# Patient Record
Sex: FEMALE | Race: WHITE | NOT HISPANIC OR LATINO | Employment: FULL TIME | URBAN - METROPOLITAN AREA
[De-identification: names, ages, dates, MRNs, and addresses within clinical notes are randomized per-mention and may not be internally consistent; named-entity substitution may affect disease eponyms.]

---

## 2017-03-22 ENCOUNTER — OFFICE VISIT (OUTPATIENT)
Dept: PSYCHIATRY | Facility: HOSPITAL | Age: 57
End: 2017-03-22

## 2017-03-22 DIAGNOSIS — F43.23 ADJUSTMENT DISORDER WITH MIXED ANXIETY AND DEPRESSED MOOD: Primary | ICD-10-CM

## 2017-03-22 PROCEDURE — 90791 PSYCH DIAGNOSTIC EVALUATION: CPT | Performed by: SOCIAL WORKER

## 2017-03-22 NOTE — PROGRESS NOTES
[id number]. Pt in for initial evaluation. She presents with increase in anger and irritability. She describes a lot of loss in her life and feels it may be impacting her current emotional difficulties. She lost her mother at age 14 and she had a sister die six months after having a baby from a brain tumor. Pt also states she was unable to have children due to severe Endometriosis. She states she is upset with  and his family because he is spending several days and nights a week with his 92 yr old mother who is in poor health. Talked about reasons she is angry and how it affects her in negative ways. She agreed to be more supportive of  and to work on improving her self care. No SI/HI present.

## 2017-04-07 DIAGNOSIS — E78.5 HYPERLIPIDEMIA, UNSPECIFIED HYPERLIPIDEMIA TYPE: Primary | ICD-10-CM

## 2017-04-07 DIAGNOSIS — I10 ESSENTIAL HYPERTENSION: ICD-10-CM

## 2017-04-10 ENCOUNTER — TELEPHONE (OUTPATIENT)
Dept: FAMILY MEDICINE CLINIC | Facility: CLINIC | Age: 57
End: 2017-04-10

## 2017-04-10 NOTE — TELEPHONE ENCOUNTER
Will need an appt to discuss. In my last note I recommended that she see a gastro if her constipation persisted     ---- Message from Nani Moore MA sent at 4/10/2017  9:06 AM EDT -----  Contact: PT BLAKE #[phone]  Please advise las seen on 10/16 and correct appt day is 04/28.   ----- Message -----     From: Viki Barajas     Sent: 4/10/2017   9:00 AM       To: Nani Moore MA    SAYS YOU TALKED WITH HER PREVIOUSLY ABOUT GETTING AN RX FOR LANEZE FOR CONSTIPATION AND SHE WOULD NOW LIKE TO HAVE THAT. SHE HAS AN APT WITH [facility] 5/28 BUT DOES NOT THINK SHE CAN WAIT THAT LONG BEFORE GETTING THE MEDICATION.    ALEXANDREA GERARDO LN   30 DAY    PLEASE LET HER KNOW IF THIS CAN BE DONE

## 2017-04-12 ENCOUNTER — OFFICE VISIT (OUTPATIENT)
Dept: PSYCHIATRY | Facility: HOSPITAL | Age: 57
End: 2017-04-12

## 2017-04-12 DIAGNOSIS — F43.23 ADJUSTMENT DISORDER WITH MIXED ANXIETY AND DEPRESSED MOOD: Primary | ICD-10-CM

## 2017-04-12 PROCEDURE — 90834 PSYTX W PT 45 MINUTES: CPT | Performed by: SOCIAL WORKER

## 2017-04-12 NOTE — PROGRESS NOTES
"[id number]. Pt in for session. She states things feel improved as she was able to talk about her feelings with her  without the \"edge\" of anger she had before. She is gaining more insight and setting healthy boundaries. She will continue to focus on healthy self care.  "

## 2017-04-13 NOTE — TELEPHONE ENCOUNTER
NEVER GOT IN TOUCH WITH PT DESPITE TRYING TO REACH SEVERAL TIMES, PT HAS AN UP COMING APPT, THAT THE ISSUE WILL BE DISCUSSED IN.

## 2017-04-14 DIAGNOSIS — K59.09 CHRONIC CONSTIPATION: Primary | ICD-10-CM

## 2017-04-19 LAB
ALBUMIN SERPL-MCNC: 4.5 G/DL (ref 3.5–5.2)
ALBUMIN/GLOB SERPL: 1.9 G/DL
ALP SERPL-CCNC: 107 U/L (ref 39–117)
ALT SERPL-CCNC: 18 U/L (ref 1–33)
AST SERPL-CCNC: 22 U/L (ref 1–32)
BASOPHILS # BLD AUTO: 0.02 10*3/MM3 (ref 0–0.2)
BASOPHILS NFR BLD AUTO: 0.4 % (ref 0–1.5)
BILIRUB SERPL-MCNC: 0.7 MG/DL (ref 0.1–1.2)
BUN SERPL-MCNC: 15 MG/DL (ref 6–20)
BUN/CREAT SERPL: 18.3 (ref 7–25)
CALCIUM SERPL-MCNC: 9.6 MG/DL (ref 8.6–10.5)
CHLORIDE SERPL-SCNC: 102 MMOL/L (ref 98–107)
CHOLEST SERPL-MCNC: 196 MG/DL (ref 0–200)
CO2 SERPL-SCNC: 27.1 MMOL/L (ref 22–29)
CREAT SERPL-MCNC: 0.82 MG/DL (ref 0.57–1)
EOSINOPHIL # BLD AUTO: 0.12 10*3/MM3 (ref 0–0.7)
EOSINOPHIL NFR BLD AUTO: 2.5 % (ref 0.3–6.2)
ERYTHROCYTE [DISTWIDTH] IN BLOOD BY AUTOMATED COUNT: 13.7 % (ref 11.7–13)
GLOBULIN SER CALC-MCNC: 2.4 GM/DL
GLUCOSE SERPL-MCNC: 91 MG/DL (ref 65–99)
HCT VFR BLD AUTO: 44 % (ref 35.6–45.5)
HDLC SERPL-MCNC: 81 MG/DL (ref 40–60)
HGB BLD-MCNC: 14.6 G/DL (ref 11.9–15.5)
IMM GRANULOCYTES # BLD: 0 10*3/MM3 (ref 0–0.03)
IMM GRANULOCYTES NFR BLD: 0 % (ref 0–0.5)
LDLC SERPL CALC-MCNC: 99 MG/DL (ref 0–100)
LDLC/HDLC SERPL: 1.22 {RATIO}
LYMPHOCYTES # BLD AUTO: 1.35 10*3/MM3 (ref 0.9–4.8)
LYMPHOCYTES NFR BLD AUTO: 27.6 % (ref 19.6–45.3)
MCH RBC QN AUTO: 33 PG (ref 26.9–32)
MCHC RBC AUTO-ENTMCNC: 33.2 G/DL (ref 32.4–36.3)
MCV RBC AUTO: 99.5 FL (ref 80.5–98.2)
MONOCYTES # BLD AUTO: 0.3 10*3/MM3 (ref 0.2–1.2)
MONOCYTES NFR BLD AUTO: 6.1 % (ref 5–12)
NEUTROPHILS # BLD AUTO: 3.1 10*3/MM3 (ref 1.9–8.1)
NEUTROPHILS NFR BLD AUTO: 63.4 % (ref 42.7–76)
PLATELET # BLD AUTO: 138 10*3/MM3 (ref 140–500)
POTASSIUM SERPL-SCNC: 4.3 MMOL/L (ref 3.5–5.2)
PROT SERPL-MCNC: 6.9 G/DL (ref 6–8.5)
RBC # BLD AUTO: 4.42 10*6/MM3 (ref 3.9–5.2)
SODIUM SERPL-SCNC: 141 MMOL/L (ref 136–145)
TRIGL SERPL-MCNC: 82 MG/DL (ref 0–150)
TSH SERPL DL<=0.005 MIU/L-ACNC: 2.26 MIU/ML (ref 0.27–4.2)
VLDLC SERPL CALC-MCNC: 16.4 MG/DL (ref 5–40)
WBC # BLD AUTO: 4.89 10*3/MM3 (ref 4.5–10.7)

## 2017-04-27 RX ORDER — OMEPRAZOLE 20 MG/1
CAPSULE, DELAYED RELEASE ORAL
COMMUNITY
Start: 2017-03-09 | End: 2019-01-15

## 2017-04-27 RX ORDER — ESTRADIOL 0.07 MG/D
FILM, EXTENDED RELEASE TRANSDERMAL
COMMUNITY
Start: 2017-03-21 | End: 2019-01-15

## 2017-04-28 ENCOUNTER — OFFICE VISIT (OUTPATIENT)
Dept: FAMILY MEDICINE CLINIC | Facility: CLINIC | Age: 57
End: 2017-04-28

## 2017-04-28 VITALS
SYSTOLIC BLOOD PRESSURE: 120 MMHG | DIASTOLIC BLOOD PRESSURE: 70 MMHG | HEART RATE: 63 BPM | TEMPERATURE: 98.3 F | RESPIRATION RATE: 16 BRPM | OXYGEN SATURATION: 98 % | WEIGHT: 177.6 LBS | HEIGHT: 67 IN | BODY MASS INDEX: 27.88 KG/M2

## 2017-04-28 DIAGNOSIS — E78.5 HYPERLIPIDEMIA, UNSPECIFIED HYPERLIPIDEMIA TYPE: ICD-10-CM

## 2017-04-28 DIAGNOSIS — K59.00 CONSTIPATION, UNSPECIFIED CONSTIPATION TYPE: ICD-10-CM

## 2017-04-28 DIAGNOSIS — M54.50 CHRONIC BILATERAL LOW BACK PAIN WITHOUT SCIATICA: Primary | ICD-10-CM

## 2017-04-28 DIAGNOSIS — G89.29 CHRONIC BILATERAL LOW BACK PAIN WITHOUT SCIATICA: Primary | ICD-10-CM

## 2017-04-28 PROCEDURE — 99214 OFFICE O/P EST MOD 30 MIN: CPT | Performed by: NURSE PRACTITIONER

## 2017-04-28 RX ORDER — ATORVASTATIN CALCIUM 20 MG/1
TABLET, FILM COATED ORAL
COMMUNITY
Start: 2017-04-14 | End: 2021-12-03

## 2017-04-28 NOTE — PROGRESS NOTES
Subjective   Cristina Larose is a 56 y.o. female. Patient is here today for   Chief Complaint   Patient presents with   • Follow-up     LABS    • Hyperlipidemia          Vitals:    04/28/17 0800   BP: 120/70   Pulse: 63   Resp: 16   Temp: 98.3 °F (36.8 °C)   SpO2: 98%   No LMP recorded. Patient has had a hysterectomy.          Past Medical History:   Diagnosis Date   • Gallstone    • GERD (gastroesophageal reflux disease)    • Hearing loss in right ear    • History of kidney stones    • Insomnia    • Low back pain    • PONV (postoperative nausea and vomiting)    • Ringing of ears       Allergies   Allergen Reactions   • Roxicet [Oxycodone-Acetaminophen] Nausea Only and Other (See Comments)     Headache       Social History     Social History   • Marital status:      Spouse name: N/A   • Number of children: N/A   • Years of education: N/A     Occupational History   • Not on file.     Social History Main Topics   • Smoking status: Never Smoker   • Smokeless tobacco: Never Used   • Alcohol use Yes      Comment: VERY RARE   • Drug use: No   • Sexual activity: Not on file     Other Topics Concern   • Not on file     Social History Narrative   • No narrative on file        Current Outpatient Prescriptions:   •  atorvastatin (LIPITOR) 20 MG tablet, , Disp: , Rfl:   •  baclofen (LIORESAL) 10 MG tablet, Take 1 tablet by mouth 3 (Three) Times a Day., Disp: 90 tablet, Rfl: 3  •  Cholecalciferol (VITAMIN D PO), Take 3 tablets by mouth every night., Disp: , Rfl:   •  Cyanocobalamin (B-12 PO), Take 1 tablet by mouth 3 (three) times a week., Disp: , Rfl:   •  estradiol (MINIVELLE, VIVELLE-DOT) 0.075 MG/24HR patch, , Disp: , Rfl:   •  Iron tablet, Take 3 tablets by mouth 1 (one) time per week., Disp: , Rfl:   •  Linaclotide 145 MCG capsule, Take 145 mcg by mouth Daily., Disp: 30 capsule, Rfl: 0  •  Magnesium 250 MG tablet, Take 200 mg by mouth daily., Disp: , Rfl:   •  Melatonin 10 MG capsule, Take 10 mg by mouth as needed.,  Disp: , Rfl:   •  Omega-3 Fatty Acids (FISH OIL) 600 MG capsule, Take 600 mg by mouth Daily. HELD FOR SURGERY, Disp: 30 capsule, Rfl:   •  omeprazole (priLOSEC) 20 MG capsule, , Disp: , Rfl:      Objective   History of Present Illness  Cristina is here for a follow up for HLD. She exercises a lot and has been eating well. She is compliant with her medication and is not experiencing any side effects. I discussed her labs in detail   She has had difficulty with chronic constipation, which has persistent even since her gallbladder was removed 6 months ago. She was called in linzess and that is working well for her. She has not had any lower abdominal cramping or diarrhea. She has had normal BMs since taking it.   She is concerned that she has had low back pain for 3 months. She thought it was due to kidney stones and saw her urologist but there were no stones. She feels like there is something in the right side of her back. She has been to the chiropractor with little relief of symptoms. The pain is moderate, non radiating, and she denies any radicular symptoms. She denies any known injury. She would like to set up PT and possibly see a specialist.     Review of Systems   Constitutional: Negative for chills, fatigue and fever.   HENT: Negative.    Respiratory: Negative for cough, shortness of breath and wheezing.    Cardiovascular: Positive for leg swelling. Negative for chest pain and palpitations.   Gastrointestinal: Positive for constipation (CHRONIC ).        BLOATING   Musculoskeletal: Positive for back pain. Negative for gait problem.        BILATERAL FOOT  PAIN AFTER WALKING FOR 2-3 HOURS, LOCATED IN THE HEEL,    Neurological: Negative for dizziness, weakness, light-headedness, numbness and headaches.   Psychiatric/Behavioral: Negative.      PHQ-9 Depression Screening 4/28/2017   Little interest or pleasure in doing things 0   Feeling down, depressed, or hopeless 0   Trouble falling or staying asleep, or sleeping  too much 1   Feeling tired or having little energy 1   Poor appetite or overeating 0   Feeling bad about yourself - or that you are a failure or have let yourself or your family down 0   Trouble concentrating on things, such as reading the newspaper or watching television 0   Moving or speaking so slowly that other people could have noticed. Or the opposite - being so fidgety or restless that you have been moving around a lot more than usual 0   Thoughts that you would be better off dead, or of hurting yourself in some way 0   PHQ-9 Total Score 2   If you checked off any problems, how difficult have these problems made it for you to do your work, take care of things at home, or get along with other people? Not difficult at all       Physical Exam   Constitutional: Vital signs are normal. She appears well-developed and well-nourished. No distress.   HENT:   Mouth/Throat: Mucous membranes are normal.   Cardiovascular: Normal rate, regular rhythm and normal heart sounds.    Pulmonary/Chest: Effort normal and breath sounds normal.   Abdominal: Soft. Normal appearance and bowel sounds are normal. There is no tenderness.   Musculoskeletal:        Lumbar back: She exhibits normal range of motion, no tenderness, no swelling and no deformity.   Neurological: She is alert. Gait normal.   Skin: Skin is warm and dry.   Psychiatric: She has a normal mood and affect. Her speech is normal.     Below is the patient's most recent value for Albumin, ALT, AST, BUN, Calcium, Chloride, Cholesterol, CO2, Creatinine, GFR, Glucose, HDL, Hematocrit, Hemoglobin, Hemoglobin A1C, LDL, Magnesium, Phosphorus, Platelets, Potassium, PSA, Sodium, Triglycerides, and WBC.   Lab Results   Component Value Date    ALBUMIN 4.50 04/19/2017    ALT 18 04/19/2017    AST 22 04/19/2017    BUN 15 04/19/2017    CALCIUM 9.6 04/19/2017     04/19/2017    CO2 27.1 04/19/2017    CREATININE 0.82 04/19/2017    GLU 91 04/19/2017    HDL 81 (H) 04/19/2017    HCT  44.0 04/19/2017    HGB 14.6 04/19/2017    LDL 99 04/19/2017     (L) 04/19/2017    K 4.3 04/19/2017     04/19/2017    TRIG 82 04/19/2017    WBC 4.89 04/19/2017     Note: for a comprehensive list of the patient's lab results, access the Results Review activity.  ASSESSMENT  Problem List Items Addressed This Visit     Constipation    Hyperlipidemia      Other Visit Diagnoses     Chronic bilateral low back pain without sciatica    -  Primary    Relevant Orders    Ambulatory Referral to Orthopedic Surgery    Ambulatory Referral to Physical Therapy Evaluate and treat    XR Spine Lumbar Complete With Flex & Ext          PLAN    1. Low back pain- discussed low back exercises, will check an xray and call with results. Will refer to ortho and PT. Hold on chiropractor for now. Ibuprofen or tylenol, heating pad as needed  2- HLD- continue with diet and exercise, continue atorvastatin  3- chronic constipation- continue linzess, follow up with GI if needed  Follow up in 6 months with lipid panel, CMP, I would also like to do a hep c screening at that time  Follow up sooner if needed

## 2017-05-03 ENCOUNTER — HOSPITAL ENCOUNTER (OUTPATIENT)
Dept: GENERAL RADIOLOGY | Facility: HOSPITAL | Age: 57
Discharge: HOME | End: 2017-05-03
Admitting: NURSE PRACTITIONER

## 2017-05-03 PROCEDURE — 72114 X-RAY EXAM L-S SPINE BENDING: CPT

## 2017-05-05 ENCOUNTER — HOSPITAL ENCOUNTER (OUTPATIENT)
Dept: PHYSICAL THERAPY | Facility: HOSPITAL | Age: 57
Setting detail: THERAPIES SERIES
Discharge: HOME | End: 2017-05-05

## 2017-05-05 ENCOUNTER — TELEPHONE (OUTPATIENT)
Dept: FAMILY MEDICINE CLINIC | Facility: CLINIC | Age: 57
End: 2017-05-05

## 2017-05-05 DIAGNOSIS — M54.50 CHRONIC RIGHT-SIDED LOW BACK PAIN WITHOUT SCIATICA: Primary | ICD-10-CM

## 2017-05-05 DIAGNOSIS — G89.29 CHRONIC RIGHT-SIDED LOW BACK PAIN WITHOUT SCIATICA: Primary | ICD-10-CM

## 2017-05-05 PROCEDURE — 97140 MANUAL THERAPY 1/> REGIONS: CPT | Performed by: PHYSICAL THERAPIST

## 2017-05-05 PROCEDURE — 97161 PT EVAL LOW COMPLEX 20 MIN: CPT | Performed by: PHYSICAL THERAPIST

## 2017-05-10 ENCOUNTER — HOSPITAL ENCOUNTER (OUTPATIENT)
Dept: PHYSICAL THERAPY | Facility: HOSPITAL | Age: 57
Setting detail: THERAPIES SERIES
Discharge: HOME | End: 2017-05-10

## 2017-05-10 DIAGNOSIS — G89.29 CHRONIC RIGHT-SIDED LOW BACK PAIN WITHOUT SCIATICA: Primary | ICD-10-CM

## 2017-05-10 DIAGNOSIS — M54.50 CHRONIC RIGHT-SIDED LOW BACK PAIN WITHOUT SCIATICA: Primary | ICD-10-CM

## 2017-05-10 PROCEDURE — 97112 NEUROMUSCULAR REEDUCATION: CPT

## 2017-05-10 PROCEDURE — 97110 THERAPEUTIC EXERCISES: CPT

## 2017-05-22 ENCOUNTER — HOSPITAL ENCOUNTER (OUTPATIENT)
Dept: PHYSICAL THERAPY | Facility: HOSPITAL | Age: 57
Setting detail: THERAPIES SERIES
Discharge: HOME | End: 2017-05-22

## 2017-05-22 DIAGNOSIS — M54.50 CHRONIC RIGHT-SIDED LOW BACK PAIN WITHOUT SCIATICA: Primary | ICD-10-CM

## 2017-05-22 DIAGNOSIS — G89.29 CHRONIC RIGHT-SIDED LOW BACK PAIN WITHOUT SCIATICA: Primary | ICD-10-CM

## 2017-05-22 PROCEDURE — 97110 THERAPEUTIC EXERCISES: CPT | Performed by: PHYSICAL THERAPIST

## 2017-05-24 ENCOUNTER — HOSPITAL ENCOUNTER (OUTPATIENT)
Dept: PHYSICAL THERAPY | Facility: HOSPITAL | Age: 57
Setting detail: THERAPIES SERIES
Discharge: HOME | End: 2017-05-24

## 2017-05-24 DIAGNOSIS — G89.29 CHRONIC RIGHT-SIDED LOW BACK PAIN WITHOUT SCIATICA: Primary | ICD-10-CM

## 2017-05-24 DIAGNOSIS — M54.50 CHRONIC RIGHT-SIDED LOW BACK PAIN WITHOUT SCIATICA: Primary | ICD-10-CM

## 2017-05-24 PROCEDURE — 97110 THERAPEUTIC EXERCISES: CPT | Performed by: PHYSICAL THERAPIST

## 2017-05-24 PROCEDURE — 97012 MECHANICAL TRACTION THERAPY: CPT | Performed by: PHYSICAL THERAPIST

## 2017-05-30 ENCOUNTER — HOSPITAL ENCOUNTER (OUTPATIENT)
Dept: PHYSICAL THERAPY | Facility: HOSPITAL | Age: 57
Setting detail: THERAPIES SERIES
Discharge: HOME | End: 2017-05-30

## 2017-05-30 PROCEDURE — 97012 MECHANICAL TRACTION THERAPY: CPT | Performed by: PHYSICAL THERAPIST

## 2017-05-30 PROCEDURE — 97140 MANUAL THERAPY 1/> REGIONS: CPT | Performed by: PHYSICAL THERAPIST

## 2017-06-01 ENCOUNTER — HOSPITAL ENCOUNTER (OUTPATIENT)
Dept: PHYSICAL THERAPY | Facility: HOSPITAL | Age: 57
Setting detail: THERAPIES SERIES
Discharge: HOME | End: 2017-06-01

## 2017-06-01 DIAGNOSIS — M54.50 CHRONIC RIGHT-SIDED LOW BACK PAIN WITHOUT SCIATICA: Primary | ICD-10-CM

## 2017-06-01 DIAGNOSIS — G89.29 CHRONIC RIGHT-SIDED LOW BACK PAIN WITHOUT SCIATICA: Primary | ICD-10-CM

## 2017-06-01 PROCEDURE — 97110 THERAPEUTIC EXERCISES: CPT | Performed by: PHYSICAL THERAPIST

## 2017-06-01 PROCEDURE — 97012 MECHANICAL TRACTION THERAPY: CPT | Performed by: PHYSICAL THERAPIST

## 2017-06-01 NOTE — THERAPY RE-EVALUATION
Outpatient Physical Therapy Ortho Re-Assessment  [facility]     Patient Name: Cristina Larose  : 1960  MRN: [id number]  Today's Date: 2017      Visit Date: 2017    Patient Active Problem List   Diagnosis   • Abnormal finding on thyroid function test   • Constipation   • Cannot sleep   • Adaptive colitis   • Hyperlipidemia   • Thyroid nodule   • Right-sided back pain   • Kidney stones   • Anxiety   • Abdominal bloating   • Gastropathy   • Esophagitis   • Loss of hair   • Hormone replacement therapy (postmenopausal)        Past Medical History:   Diagnosis Date   • Gallstone    • GERD (gastroesophageal reflux disease)    • Hearing loss in right ear    • History of kidney stones    • Insomnia    • Low back pain    • PONV (postoperative nausea and vomiting)    • Ringing of ears         Past Surgical History:   Procedure Laterality Date   • BUNIONECTOMY     • CHOLECYSTECTOMY     • CHOLECYSTECTOMY WITH INTRAOPERATIVE CHOLANGIOGRAM N/A 2016    Procedure: CHOLECYSTECTOMY LAPAROSCOPIC INTRAOPERATIVE CHOLANGIOGRAM;  Surgeon: Adeline Kumar MD;  [place] [facility] [place] [facility] [place]  Service:    • COLONOSCOPY      Dr. Travis/Lola   • HYSTERECTOMY      Dr. Friend   • INCISION AND DRAINAGE EXTERNAL EAR      left       Visit Dx:     ICD-10-CM ICD-9-CM   1. Chronic right-sided low back pain without sciatica M54.5 724.2    G89.29 338.29                                 Therapy Education       17 0831          Therapy Education    Given HEP;Symptoms/condition management;Pain management;Posture/body mechanics;Mobility training  -GJ      Program Reinforced  -GJ      How Provided Verbal  -GJ      Provided to Patient  -GJ      Level of Understanding Verbalized  -GJ        User Key  (r) = Recorded By, (t) = Taken By, (c) = Cosigned By    Initials Name Provider Type    LIZ Burnett PT Physical Therapist                PT OP Goals       17 0800       PT Short Term Goals    STG Date to Achieve  05/19/17  -GJ     STG 1 pt. to be I with initial HEP to facilitate self management of their condition  -GJ     STG 1 Progress Met  -GJ     STG 2 pt. to be educated in/verbalize understanding of the importance of posture/ergonomics in association with their condition to facilitate self management of their condition  -GJ     STG 2 Progress Met  -GJ     Long Term Goals    LTG Date to Achieve 06/30/17  -GJ     LTG 1 pt. to be I with advanced HEP to facilitate self management of their condition  -GJ     LTG 1 Progress Progressing  -GJ     LTG 2 pt. to report an Oswestry score </= 14% to demonstrate decreased level of perceived disability  -GJ     LTG 2 Progress Ongoing  -GJ     LTG 2 Progress Comments 26%  -GJ     LTG 3 pt to report >/= 50% improvement in her LBP to facilitate ease with participating in community activities  -GJ     LTG 3 Progress Ongoing  -GJ     LTG 4 pt to demonstrate minimal trigger points R glut tissue to facilitate ease of return to work out routine.   -GJ     LTG 4 Progress Progressing  -GJ       User Key  (r) = Recorded By, (t) = Taken By, (c) = Cosigned By    Initials Name Provider Type    LIZ Burnett, PT Physical Therapist                PT Assessment/Plan       06/01/17 0914       PT Assessment    Functional Limitations Performance in work activities;Limitations in community activities;Performance in leisure activities;Limitation in home management  -GJ     Impairments Pain;Muscle strength;Poor body mechanics;Posture;Range of motion;Impaired flexibility;Impaired postural alignment;Impaired muscle length  -GJ     Assessment Comments Ms. Larose is a 55 y/o female, hx of chronic LBP/scolisosi.  She has attended 6 sessions of physical therapy to address her LBP.  She present to the clinic today reporting that today is the best she has felt in some time.  She reports waking this morning with only 2/10 pain and an ability to stand nearly erect.  She demonstrates improved understanding of her  anatomy/ergonoimcis/activity modification. She demonstrates improving core/hip girdle strength/awareness.  She demosntrates decreasing trigger points throughout R glut/piriformis tissues.  Ms. Larose reports an Oswestry score of 26%, scored 0-100, 0 represents no perceived disability.  This is not an MDIC for dectable change from her initial evaluation.  Ms. Larose demonstrates s/s consistent with degenerative changes of her spine.  She may benefit from the use of home lumbar spine traction.  She may benefit from skilled physical therapy to progress her program, understanding of her condition.    -GJ     Please refer to paper survey for additional self-reported information Yes  -GJ     Rehab Potential Good  -GJ     Patient/caregiver participated in establishment of treatment plan and goals Yes  -GJ     Patient would benefit from skilled therapy intervention Yes  -GJ     PT Plan    PT Frequency 1x/week;2x/week  -GJ     Predicted Duration of Therapy Intervention (days/wks) 4 weeks   -GJ     Planned CPT's? PT RE-EVAL: 81714;PT THER PROC EA 15 MIN: 86843;PT MANUAL THERAPY EA 15 MIN: 02632;PT AQUATIC THERAPY EA 15 MIN: 03545;PT HOT OR COLD PACK TREAT MCARE;PT ELECTRICAL STIM UNATTEND: ;PT ULTRASOUND EA 15 MIN: 91294;PT TRACTION LUMBAR: 37973  -GJ     PT Plan Comments manual stretch of HS, piriformis, IT, hip flexor tissue (R), consider manual deep tissue work to R glut tissue.  Repeat traction, prepare for DC to HEP in 3 visits   -GJ       User Key  (r) = Recorded By, (t) = Taken By, (c) = Cosigned By    Initials Name Provider Type     Loc Burnett, PT Physical Therapist                Modalities       06/01/17 0800          Moist Heat    MH Applied Yes  -GJ      Location MH to lumbar spine while on traction, pt. supine with BLE elevarted over traction stool .   -GJ      Rx Minutes 15 mins  -GJ      MH S/P Rx Yes  -GJ      Traction 51014    Traction Type Lumbar  -GJ      Rx Minutes 15  -GJ      Duration  Intermittent  -GJ      Position Hook-lying  -GJ      Weight 50   /30  -GJ      Hold 45  -GJ      Relax 15  -GJ        User Key  (r) = Recorded By, (t) = Taken By, (c) = Cosigned By    Initials Name Provider Type    LIZ Burnett, ROBIN Physical Therapist              Exercises       06/01/17 0800          Subjective Comments    Subjective Comments I'm feeling much better after last time.  I still woke up with about 2/10 pain, but I'm standing straighter.   -GJ      Subjective Pain    Pre-Treatment Pain Level 2  -GJ      Exercise 3    Exercise Name 3 Nustep with UEs and TA  -GJ      Time (Minutes) 3 5  -GJ      Exercise 7    Exercise Name 7 Sidelying abduction with TA  -GJ      Sets 7 2  -GJ      Reps 7 10  -GJ      Exercise 8    Exercise Name 8 Quadruped UE/LE lift  -GJ      Cueing 8 Demo  -GJ      Reps 8 10  -GJ      Exercise 11    Exercise Name 11 prone over pillow, alt arm/leg  -GJ      Cueing 11 Demo  -GJ      Reps 11 10  -GJ      Exercise 12    Exercise Name 12 SLS, shoulder ext with TA,   -GJ      Cueing 12 Demo  -GJ      Equipment 12 Theraband  -GJ      Resistance 12 Red  -GJ      Sets 12 2  -GJ      Reps 12 10   each foot   -GJ      Exercise 13    Exercise Name 13 prone on elbows,   -GJ      Cueing 13 Demo  -GJ      Reps 13 3  -GJ      Time (Seconds) 13 30  -GJ        User Key  (r) = Recorded By, (t) = Taken By, (c) = Cosigned By    Initials Name Provider Type    LIZ Burnett, PT Physical Therapist           Manual Rx (last 36 hours)      Manual Treatments       06/01/17 0900          Manual Rx 3    Manual Rx 3 Location manual R HS stretch, with bias of crossing mid line  -GJ      Manual Rx 3 Duration 8 min  -GJ        User Key  (r) = Recorded By, (t) = Taken By, (c) = Cosigned By    Initials Name Provider Type    LIZ Burnett, ROBIN Physical Therapist                            Outcome Measures       06/01/17 0900          Modified Oswestry    Modified Oswestry Score/Comments 26%  -GJ       Functional Assessment    Outcome Measure Options Modifed Owestry  -GJ        User Key  (r) = Recorded By, (t) = Taken By, (c) = Cosigned By    Initials Name Provider Type    LIZ Burnett, PT Physical Therapist            Time Calculation:   Start Time: 0831  Stop Time: 0920  Time Calculation (min): 49 min     Therapy Charges for Today     Code Description Service Date Service Provider Modifiers Qty    59152812638 HC PT HOT OR COLD PACK TREAT MCARE 6/1/2017 Loc Burnett, PT GP 1    46602187143 HC PT TRACTION LUMBAR 6/1/2017 Loc Burnett, PT GP 1    98422495421 HC PT THER PROC EA 15 MIN 6/1/2017 Loc Burnett, PT GP 2          PT G-Codes  Outcome Measure Options: Modifed Kmy         Loc Burnett, PT  6/1/2017

## 2017-06-05 ENCOUNTER — HOSPITAL ENCOUNTER (OUTPATIENT)
Dept: PHYSICAL THERAPY | Facility: HOSPITAL | Age: 57
Setting detail: THERAPIES SERIES
Discharge: HOME | End: 2017-06-05

## 2017-06-05 DIAGNOSIS — M54.50 CHRONIC RIGHT-SIDED LOW BACK PAIN WITHOUT SCIATICA: Primary | ICD-10-CM

## 2017-06-05 DIAGNOSIS — G89.29 CHRONIC RIGHT-SIDED LOW BACK PAIN WITHOUT SCIATICA: Primary | ICD-10-CM

## 2017-06-05 PROCEDURE — 97012 MECHANICAL TRACTION THERAPY: CPT | Performed by: PHYSICAL THERAPIST

## 2017-06-05 PROCEDURE — 97140 MANUAL THERAPY 1/> REGIONS: CPT | Performed by: PHYSICAL THERAPIST

## 2017-06-05 PROCEDURE — 97110 THERAPEUTIC EXERCISES: CPT | Performed by: PHYSICAL THERAPIST

## 2017-06-05 NOTE — THERAPY TREATMENT NOTE
Outpatient Physical Therapy Ortho Treatment Note  [facility]     Patient Name: Cristina Larose  : 1960  MRN: [id number]  Today's Date: 2017      Visit Date: 2017    Visit Dx:    ICD-10-CM ICD-9-CM   1. Chronic right-sided low back pain without sciatica M54.5 724.2    G89.29 338.29       Patient Active Problem List   Diagnosis   • Abnormal finding on thyroid function test   • Constipation   • Cannot sleep   • Adaptive colitis   • Hyperlipidemia   • Thyroid nodule   • Right-sided back pain   • Kidney stones   • Anxiety   • Abdominal bloating   • Gastropathy   • Esophagitis   • Loss of hair   • Hormone replacement therapy (postmenopausal)        Past Medical History:   Diagnosis Date   • Gallstone    • GERD (gastroesophageal reflux disease)    • Hearing loss in right ear    • History of kidney stones    • Insomnia    • Low back pain    • PONV (postoperative nausea and vomiting)    • Ringing of ears         Past Surgical History:   Procedure Laterality Date   • BUNIONECTOMY     • CHOLECYSTECTOMY     • CHOLECYSTECTOMY WITH INTRAOPERATIVE CHOLANGIOGRAM N/A 2016    Procedure: CHOLECYSTECTOMY LAPAROSCOPIC INTRAOPERATIVE CHOLANGIOGRAM;  Surgeon: Adeline Kumar MD;  [place] [facility] [place] [facility] [place]:    • COLONOSCOPY      Dr. Travis/Lola   • HYSTERECTOMY      Dr. Friend   • INCISION AND DRAINAGE EXTERNAL EAR      left                             PT Assessment/Plan       17 0830       PT Assessment    Assessment Comments Ms. Larose verbalizes a good understanding of her condition. She reports modifications in her exercise routine which seem to have helped. She reports that she is going to order a home lumbar traction machine (will bring it in to be educated on proper use).  She continues to progress quite well and should be ready for DC to HEP soon.   -GJ     PT Plan    PT Plan Comments Possible DC to HEP in 2 sessions. Continue with traction, manual HS stretch, deep tissue  work.   -GJ       User Key  (r) = Recorded By, (t) = Taken By, (c) = Cosigned By    Initials Name Provider Type    GJ Loc Burnett, PT Physical Therapist                Modalities       06/05/17 0700          Moist Heat    MH Applied Yes  -GJ      Location MH to lumbar spine while on traction, pt. supine with BLE elevarted over traction stool .   -GJ      Rx Minutes 15 mins  -GJ      MH S/P Rx Yes  -GJ      Traction 81197    Traction Type Lumbar  -GJ      Rx Minutes 15  -GJ      Duration Intermittent  -GJ      Position Hook-lying  -GJ      Weight 50   /35  -GJ      Hold 45  -GJ      Relax 15  -GJ        User Key  (r) = Recorded By, (t) = Taken By, (c) = Cosigned By    Initials Name Provider Type    LIZ Burnett, PT Physical Therapist                Exercises       06/05/17 0700          Subjective Comments    Subjective Comments I'm 90% better from when I started. I was able to pain the garage this weekend and it didn't bother me as much as I wanted.   -GJ      Exercise 3    Exercise Name 3 Nustep with UEs and TA  -GJ      Time (Minutes) 3 5  -GJ      Exercise 7    Exercise Name 7 Sidelying abduction with TA  -GJ      Cueing 7 Verbal  -GJ      Sets 7 2  -GJ      Reps 7 10  -GJ      Exercise 8    Exercise Name 8 Quadruped UE/LE lift  -GJ      Cueing 8 Demo  -GJ      Reps 8 10  -GJ      Time (Seconds) 8 3  -GJ      Exercise 11    Exercise Name 11 prone over pillow, alt arm/leg  -GJ      Cueing 11 Demo  -GJ      Reps 11 10  -GJ      Exercise 12    Exercise Name 12 SLS, shoulder ext with TA,   -GJ      Cueing 12 Demo  -GJ      Equipment 12 Theraband  -GJ      Resistance 12 Green  -GJ      Sets 12 2  -GJ      Reps 12 10  -GJ      Exercise 13    Exercise Name 13 prone on elbows,   -GJ      Cueing 13 Demo  -GJ      Reps 13 3  -GJ      Time (Seconds) 13 30  -GJ        User Key  (r) = Recorded By, (t) = Taken By, (c) = Cosigned By    Initials Name Provider Type    LIZ Burnett, PT Physical Therapist                         Manual Rx (last 36 hours)      Manual Treatments       06/05/17 0700          Manual Rx 2    Manual Rx 2 Location deep STM to R glut medius, minimus, piriformis, lateral thigh tissues, pt. in L SL with pillow between knees.   -GJ        User Key  (r) = Recorded By, (t) = Taken By, (c) = Cosigned By    Initials Name Provider Type    LIZ Burnett, PT Physical Therapist                PT OP Goals       06/05/17 0700       PT Short Term Goals    STG Date to Achieve 05/19/17  -GJ     STG 1 pt. to be I with initial HEP to facilitate self management of their condition  -GJ     STG 1 Progress Met  -GJ     STG 2 pt. to be educated in/verbalize understanding of the importance of posture/ergonomics in association with their condition to facilitate self management of their condition  -GJ     STG 2 Progress Met  -GJ     Long Term Goals    LTG Date to Achieve 06/30/17  -GJ     LTG 1 pt. to be I with advanced HEP to facilitate self management of their condition  -GJ     LTG 1 Progress Progressing  -GJ     LTG 2 pt. to report an Oswestry score </= 14% to demonstrate decreased level of perceived disability  -GJ     LTG 2 Progress Ongoing  -GJ     LTG 3 pt to report >/= 50% improvement in her LBP to facilitate ease with participating in community activities  -GJ     LTG 3 Progress Ongoing  -GJ     LTG 4 pt to demonstrate minimal trigger points R glut tissue to facilitate ease of return to work out routine.   -GJ     LTG 4 Progress Met  -GJ       User Key  (r) = Recorded By, (t) = Taken By, (c) = Cosigned By    Initials Name Provider Type    LIZ Burnett, PT Physical Therapist                Therapy Education       06/05/17 0748          Therapy Education    Given HEP;Symptoms/condition management;Pain management;Posture/body mechanics;Mobility training  -GJ      Program Reinforced  -GJ      How Provided Verbal  -GJ      Provided to Patient  -GJ      Level of Understanding Verbalized  -GJ        User Key   (r) = Recorded By, (t) = Taken By, (c) = Cosigned By    Initials Name Provider Type    GJ Loc Burnett, PT Physical Therapist                Time Calculation:   Start Time: 0744  Stop Time: 0839  Time Calculation (min): 55 min    Therapy Charges for Today     Code Description Service Date Service Provider Modifiers Qty    33134713504 HC PT HOT OR COLD PACK TREAT MCARE 6/5/2017 Loc Burnett, PT GP 1    84475272502 HC PT TRACTION LUMBAR 6/5/2017 Loc Burnett, PT 61, GP 1    98165428032 HC PT MANUAL THERAPY EA 15 MIN 6/5/2017 Loc Burnett, PT 61, GP 1    57627406039 HC PT THER PROC EA 15 MIN 6/5/2017 Loc Burnett, PT GP 1                    Loc Burnett, PT  6/5/2017

## 2017-06-07 ENCOUNTER — HOSPITAL ENCOUNTER (OUTPATIENT)
Dept: PHYSICAL THERAPY | Facility: HOSPITAL | Age: 57
Setting detail: THERAPIES SERIES
Discharge: HOME | End: 2017-06-07

## 2017-06-07 DIAGNOSIS — G89.29 CHRONIC RIGHT-SIDED LOW BACK PAIN WITHOUT SCIATICA: Primary | ICD-10-CM

## 2017-06-07 DIAGNOSIS — M54.50 CHRONIC RIGHT-SIDED LOW BACK PAIN WITHOUT SCIATICA: Primary | ICD-10-CM

## 2017-06-07 PROCEDURE — 97012 MECHANICAL TRACTION THERAPY: CPT | Performed by: PHYSICAL THERAPIST

## 2017-06-07 PROCEDURE — 97110 THERAPEUTIC EXERCISES: CPT | Performed by: PHYSICAL THERAPIST

## 2017-06-07 NOTE — THERAPY TREATMENT NOTE
Outpatient Physical Therapy Ortho Treatment Note  [facility]     Patient Name: Cristina Larose  : 1960  MRN: [id number]  Today's Date: 2017      Visit Date: 2017    Visit Dx:    ICD-10-CM ICD-9-CM   1. Chronic right-sided low back pain without sciatica M54.5 724.2    G89.29 338.29       Patient Active Problem List   Diagnosis   • Abnormal finding on thyroid function test   • Constipation   • Cannot sleep   • Adaptive colitis   • Hyperlipidemia   • Thyroid nodule   • Right-sided back pain   • Kidney stones   • Anxiety   • Abdominal bloating   • Gastropathy   • Esophagitis   • Loss of hair   • Hormone replacement therapy (postmenopausal)        Past Medical History:   Diagnosis Date   • Gallstone    • GERD (gastroesophageal reflux disease)    • Hearing loss in right ear    • History of kidney stones    • Insomnia    • Low back pain    • PONV (postoperative nausea and vomiting)    • Ringing of ears         Past Surgical History:   Procedure Laterality Date   • BUNIONECTOMY     • CHOLECYSTECTOMY     • CHOLECYSTECTOMY WITH INTRAOPERATIVE CHOLANGIOGRAM N/A 2016    Procedure: CHOLECYSTECTOMY LAPAROSCOPIC INTRAOPERATIVE CHOLANGIOGRAM;  Surgeon: Adeline Kumar MD;  Location: [facility] OR [facility] [place];  Service:    • COLONOSCOPY      Dr. Travis/Lola   • HYSTERECTOMY      Dr. Friend   • INCISION AND DRAINAGE EXTERNAL EAR      left                             PT Assessment/Plan       17 0731       PT Assessment    Assessment Comments Ms. Laorse will be ready DC to HEP next session. We answered many questions regarding exercise routines, do's and don'ts.    -GJ     PT Plan    PT Plan Comments DC to HEP following next session  -GJ       User Key  (r) = Recorded By, (t) = Taken By, (c) = Cosigned By    Initials Name Provider Type    LIZ Burnett, PT Physical Therapist                Modalities       17 0600          Moist Heat    MH Applied Yes  -GJ      Location MH to lumbar spine  while on traction, pt. supine with BLE elevarted over traction stool .   -GJ      Rx Minutes 15 mins  -GJ      MH S/P Rx Yes  -GJ      Traction 93463    Traction Type Lumbar  -GJ      Rx Minutes 15  -GJ      Duration Intermittent  -GJ      Position Hook-lying  -GJ      Weight 50   /35  -GJ      Hold 45  -GJ      Relax 15  -GJ        User Key  (r) = Recorded By, (t) = Taken By, (c) = Cosigned By    Initials Name Provider Type    GJ Loc Burnett, PT Physical Therapist                Exercises       06/07/17 0600          Subjective Comments    Subjective Comments I'm tired today.   -GJ      Exercise 1    Exercise Name 1 leg press, bilateral/unilateral   -GJ      Cueing 1 Demo  -GJ      Equipment 1 Leg Press  -GJ      Weights/Plates 1 --   80  -GJ      Reps 1 15  -GJ      Exercise 2    Exercise Name 2 bosu ball balance  -GJ      Cueing 2 Demo  -GJ      Reps 2 3  -GJ      Time (Seconds) 2 15  -GJ      Exercise 3    Exercise Name 3 Nustep with UEs and TA  -GJ      Time (Minutes) 3 5  -GJ      Exercise 7    Exercise Name 7 Sidelying abduction with TA  -GJ      Cueing 7 Verbal  -GJ      Sets 7 2  -GJ      Reps 7 10  -GJ      Exercise 8    Exercise Name 8 Quadruped UE/LE lift  -GJ      Cueing 8 Demo  -GJ      Reps 8 10  -GJ      Time (Seconds) 8 3  -GJ      Exercise 11    Exercise Name 11 prone over pillow, alt arm/leg  -GJ      Cueing 11 Demo  -GJ      Reps 11 10  -GJ      Exercise 12    Exercise Name 12 SLS, shoulder ext with TA,   -GJ      Cueing 12 Demo  -GJ      Equipment 12 Theraband  -GJ      Resistance 12 Green  -GJ      Sets 12 2  -GJ      Reps 12 10  -GJ        User Key  (r) = Recorded By, (t) = Taken By, (c) = Cosigned By    Initials Name Provider Type    GJ Loc Burnett, PT Physical Therapist                               PT OP Goals       06/07/17 0600       PT Short Term Goals    STG Date to Achieve 05/19/17  -GJ     STG 1 pt. to be I with initial HEP to facilitate self management of their condition  -GJ      STG 1 Progress Met  -GJ     STG 2 pt. to be educated in/verbalize understanding of the importance of posture/ergonomics in association with their condition to facilitate self management of their condition  -GJ     STG 2 Progress Met  -GJ     Long Term Goals    LTG Date to Achieve 06/30/17  -GJ     LTG 1 pt. to be I with advanced HEP to facilitate self management of their condition  -GJ     LTG 1 Progress Progressing  -GJ     LTG 2 pt. to report an Oswestry score </= 14% to demonstrate decreased level of perceived disability  -GJ     LTG 2 Progress Ongoing  -GJ     LTG 3 pt to report >/= 50% improvement in her LBP to facilitate ease with participating in community activities  -GJ     LTG 3 Progress Ongoing  -GJ     LTG 4 pt to demonstrate minimal trigger points R glut tissue to facilitate ease of return to work out routine.   -GJ     LTG 4 Progress Met  -GJ       User Key  (r) = Recorded By, (t) = Taken By, (c) = Cosigned By    Initials Name Provider Type    LIZ Burnett PT Physical Therapist                Therapy Education       06/07/17 0657          Therapy Education    Given HEP;Symptoms/condition management;Pain management;Posture/body mechanics;Mobility training  -GJ      Program Reinforced  -GJ      How Provided Verbal  -GJ      Provided to Patient  -GJ      Level of Understanding Verbalized  -GJ        User Key  (r) = Recorded By, (t) = Taken By, (c) = Cosigned By    Initials Name Provider Type    LIZ Burnett PT Physical Therapist                Time Calculation:   Start Time: 0700  Stop Time: 0756  Time Calculation (min): 56 min    Therapy Charges for Today     Code Description Service Date Service Provider Modifiers Qty    62880802251 HC PT TRACTION LUMBAR 6/7/2017 Loc Burnett, PT GP 1    40062545204 HC PT HOT OR COLD PACK TREAT MCARE 6/7/2017 Loc Burnett, PT GP 1    15570711734 HC PT THER PROC EA 15 MIN 6/7/2017 Loc Burnett, PT GP 2                    Loc Burnett  PT  6/7/2017

## 2017-06-13 ENCOUNTER — HOSPITAL ENCOUNTER (OUTPATIENT)
Dept: PHYSICAL THERAPY | Facility: HOSPITAL | Age: 57
Setting detail: THERAPIES SERIES
Discharge: HOME | End: 2017-06-13

## 2017-06-13 DIAGNOSIS — M54.50 CHRONIC RIGHT-SIDED LOW BACK PAIN WITHOUT SCIATICA: Primary | ICD-10-CM

## 2017-06-13 DIAGNOSIS — G89.29 CHRONIC RIGHT-SIDED LOW BACK PAIN WITHOUT SCIATICA: Primary | ICD-10-CM

## 2017-06-13 PROCEDURE — 97012 MECHANICAL TRACTION THERAPY: CPT | Performed by: PHYSICAL THERAPIST

## 2017-06-13 PROCEDURE — 97110 THERAPEUTIC EXERCISES: CPT | Performed by: PHYSICAL THERAPIST

## 2017-06-13 NOTE — THERAPY DISCHARGE NOTE
Outpatient Physical Therapy Ortho Treatment Note/Discharge Summary  [facility]     Patient Name: Cristina Larose  : 1960  MRN: [id number]  Today's Date: 2017      Visit Date: 2017    Visit Dx:    ICD-10-CM ICD-9-CM   1. Chronic right-sided low back pain without sciatica M54.5 724.2    G89.29 338.29       Patient Active Problem List   Diagnosis   • Abnormal finding on thyroid function test   • Constipation   • Cannot sleep   • Adaptive colitis   • Hyperlipidemia   • Thyroid nodule   • Right-sided back pain   • Kidney stones   • Anxiety   • Abdominal bloating   • Gastropathy   • Esophagitis   • Loss of hair   • Hormone replacement therapy (postmenopausal)        Past Medical History:   Diagnosis Date   • Gallstone    • GERD (gastroesophageal reflux disease)    • Hearing loss in right ear    • History of kidney stones    • Insomnia    • Low back pain    • PONV (postoperative nausea and vomiting)    • Ringing of ears         Past Surgical History:   Procedure Laterality Date   • BUNIONECTOMY     • CHOLECYSTECTOMY     • CHOLECYSTECTOMY WITH INTRAOPERATIVE CHOLANGIOGRAM N/A 2016    Procedure: CHOLECYSTECTOMY LAPAROSCOPIC INTRAOPERATIVE CHOLANGIOGRAM;  Surgeon: Adeline Kumar MD;  Location:  [facility] [place];  Service:    • COLONOSCOPY      Dr. Travis/Lola   • HYSTERECTOMY      Dr. Friend   • INCISION AND DRAINAGE EXTERNAL EAR      left                             PT Assessment/Plan       17 0841       PT Assessment    Assessment Comments Ms. Larose attended 9 sessions of physical therapy from 17-17 for her LBP.  She is indepedent with her HEP, verbalizes a good understanding of her condition.  She reports that she is going to obtain a home lumbar traction unit for self management.  She has met all fo her goals excpet for one. Ms. Larose is encouraged to call with any questions.  She is dischraged from outpatient physical therapy to an independnent program.   -GJ      PT Plan    PT Plan Comments call with questions  -GJ       User Key  (r) = Recorded By, (t) = Taken By, (c) = Cosigned By    Initials Name Provider Type    LIZ Burnett, PT Physical Therapist                Modalities       06/13/17 0700          Subjective Comments    Subjective Comments I brought my  in so he can understand the traction and help  -GJ      Moist Heat    MH Applied Yes  -GJ      Location MH to lumbar spine while on traction, pt. supine with BLE elevarted over traction stool .   -GJ      Rx Minutes 15 mins  -GJ      MH S/P Rx Yes  -GJ      Traction 81726    Traction Type Lumbar  -GJ      Rx Minutes 15  -GJ      Duration Intermittent  -GJ      Position Hook-lying  -GJ      Weight 55   /35  -GJ      Hold 45  -GJ      Relax 15  -GJ        User Key  (r) = Recorded By, (t) = Taken By, (c) = Cosigned By    Initials Name Provider Type    LIZ Burnett, PT Physical Therapist                Exercises       06/13/17 0700          Subjective Comments    Subjective Comments I brought my  in so he can understand the traction and help  -GJ      Exercise 4    Exercise Name 4 educated husbad and patient in use of home traction  -GJ      Exercise 5    Exercise Name 5 supine over bolster with alt. arms  -GJ      Reps 5 20  -GJ        User Key  (r) = Recorded By, (t) = Taken By, (c) = Cosigned By    Initials Name Provider Type    LIZ Burnett, PT Physical Therapist                               PT OP Goals       06/13/17 0700       PT Short Term Goals    STG Date to Achieve 05/19/17  -GJ     STG 1 pt. to be I with initial HEP to facilitate self management of their condition  -GJ     STG 1 Progress Met  -GJ     STG 2 pt. to be educated in/verbalize understanding of the importance of posture/ergonomics in association with their condition to facilitate self management of their condition  -GJ     STG 2 Progress Met  -GJ     Long Term Goals    LTG Date to Achieve 06/30/17  -GJ     LTG 1 pt. to  be I with advanced HEP to facilitate self management of their condition  -GJ     LTG 1 Progress Progressing  -GJ     LTG 2 pt. to report an Oswestry score </= 14% to demonstrate decreased level of perceived disability  -GJ     LTG 2 Progress Not Met  -GJ     LTG 2 Progress Comments 24%  -GJ     LTG 3 pt to report >/= 50% improvement in her LBP to facilitate ease with participating in community activities  -GJ     LTG 3 Progress Met  -GJ     LTG 4 pt to demonstrate minimal trigger points R glut tissue to facilitate ease of return to work out routine.   -GJ     LTG 4 Progress Met  -GJ       User Key  (r) = Recorded By, (t) = Taken By, (c) = Cosigned By    Initials Name Provider Type    LIZ Burnett PT Physical Therapist                Therapy Education       06/13/17 0703          Therapy Education    Given HEP;Symptoms/condition management;Pain management;Posture/body mechanics;Mobility training   discussed aquiring home davis lumbar traction unit for home, to use daily, 5-10 min. 30-50 pounds, scaling back to 3 x's a week after 2-3 weeks, encoruaged to kaur with questions  -GJ      Program Reinforced  -GJ      How Provided Verbal  -GJ      Provided to Patient  -GJ      Level of Understanding Verbalized  -GJ        User Key  (r) = Recorded By, (t) = Taken By, (c) = Cosigned By    Initials Name Provider Type    LIZ Burnett PT Physical Therapist                Outcome Measures       06/13/17 0700          Modified Oswestry    Modified Oswestry Score/Comments 24%  -GJ      Functional Assessment    Outcome Measure Options Modifed [place]  -GJ        User Key  (r) = Recorded By, (t) = Taken By, (c) = Cosigned By    Initials Name Provider Type    LIZ Burnett PT Physical Therapist            Time Calculation:   Start Time: 0703  Stop Time: 0745  Time Calculation (min): 42 min    Therapy Charges for Today     Code Description Service Date Service Provider Modifiers Qty    70024763524  PT HOT OR COLD  PACK TREAT MCARE 6/13/2017 Loc Burnett, PT GP 1    35499301874 HC PT TRACTION LUMBAR 6/13/2017 Loc Burnett, PT GP 1    82742560227 HC PT THER PROC EA 15 MIN 6/13/2017 Loc Burnett, PT GP 2          PT G-Codes  Outcome Measure Options: Modifed [place]     OP PT Discharge Summary  Date of Discharge: 06/13/17  Reason for Discharge: Maximum functional potential achieved, Independent  Outcomes Achieved: Patient able to partially acheive established goals  Discharge Destination: Home with home program  Discharge Instructions: call with questions, see education for parameters for home traction.       Loc Burnett, PT  6/13/2017

## 2017-06-23 ENCOUNTER — TRANSCRIBE ORDERS (OUTPATIENT)
Dept: ADMINISTRATIVE | Facility: HOSPITAL | Age: 57
End: 2017-06-23

## 2017-06-23 DIAGNOSIS — Z12.31 VISIT FOR SCREENING MAMMOGRAM: Primary | ICD-10-CM

## 2017-07-14 ENCOUNTER — OFFICE VISIT (OUTPATIENT)
Dept: ORTHOPEDIC SURGERY | Facility: CLINIC | Age: 57
End: 2017-07-14

## 2017-07-14 VITALS — HEIGHT: 66 IN | TEMPERATURE: 98.6 F | WEIGHT: 178 LBS | BODY MASS INDEX: 28.61 KG/M2

## 2017-07-14 DIAGNOSIS — M41.9 ACQUIRED SCOLIOSIS: ICD-10-CM

## 2017-07-14 DIAGNOSIS — G89.29 CHRONIC LOW BACK PAIN, UNSPECIFIED BACK PAIN LATERALITY, WITH SCIATICA PRESENCE UNSPECIFIED: Primary | ICD-10-CM

## 2017-07-14 DIAGNOSIS — M54.5 CHRONIC LOW BACK PAIN, UNSPECIFIED BACK PAIN LATERALITY, WITH SCIATICA PRESENCE UNSPECIFIED: Primary | ICD-10-CM

## 2017-07-14 PROCEDURE — 99203 OFFICE O/P NEW LOW 30 MIN: CPT | Performed by: ORTHOPAEDIC SURGERY

## 2017-07-14 NOTE — PROGRESS NOTES
New patient or new problem visit    Chief Complaint   Patient presents with   • Lumbar Spine - Pain       HPI: She complains of a long-standing history of back pain worse in the last 4 years moderate intermittent variable aching burning.  Has had some pain in the leg which improved immediately after therapy which she continues.  She likes to hike for exercise.  She uses a lumbar pad on her desk.  She a has a history of scoliosis known since her late 20s and her mother had scoliosis.  Anti-inflammatory agents help somewhat.    PFSH: See chart- reviewed    Review of Systems   Constitutional: Negative for chills, fever and unexpected weight change.   HENT: Negative for trouble swallowing and voice change.    Eyes: Negative for visual disturbance.   Respiratory: Negative for cough and shortness of breath.    Cardiovascular: Negative for chest pain and leg swelling.   Gastrointestinal: Negative for abdominal pain, nausea and vomiting.   Endocrine: Negative for cold intolerance and heat intolerance.   Genitourinary: Negative for difficulty urinating, dysuria, frequency, pelvic pain and urgency.   Musculoskeletal: Positive for back pain.   Skin: Negative for rash and wound.   Allergic/Immunologic: Negative for immunocompromised state.   Neurological: Negative for weakness, numbness and headaches.   Hematological: Does not bruise/bleed easily.   Psychiatric/Behavioral: Negative for dysphoric mood. The patient is not nervous/anxious.        PE: Constitutional: Vital signs above-noted.  Awake, alert and oriented    Psychiatric: Affect and insight do not appear grossly disturbed.    Pulmonary: Breathing is unlabored, color is good.    Skin: Warm, dry and normal turgor    Cardiac:  pedal pulses intact.  No edema.    Eyesight and hearing appear adequate for examination purposes      Musculoskeletal:  There is mild tenderness to percussion and palpation of the spine. Motion appears undisturbed.  Posture is unremarkable to coronal  and sagittal inspection.  Slight right loin hump.  Pelvis appears level.   The skin about the area is intact.  There is no palpable or visible deformity.  There is no local spasm.       Neurologic:   Reflexes are 2+ and symmetrical in the patellae and achilles.   Motor function is undisturbed in quadriceps, EHL, and gastrocnemius      Sensation appears symmetrically intact to light touch   .  In the bilateral lower extremities there is no evidence of atrophy.   Clonus is absent..  Gait appears undisturbed.  SLR test negative      MEDICAL DECISION MAKING    XRAY: Hospital x-rays show a 26° scoliosis in the thoracolumbar region which appears well balanced.  Pelvis appears level.  Mild multilevel degenerative change.  I agree with the radiologist report.    Other: n/a    Impression: Chronic lumbar back pain probably from disc degeneration and adult presentation of moderate scoliosis which at this point may not be related to her pain    Plan: Or vascular exercise and I explained the nature thereof.  Follow-up when necessary  Answers for HPI/ROS submitted by the patient on 7/7/2017   Back pain  Chronicity: recurrent  Onset: more than 1 month ago  Frequency: daily  Progression since onset: waxing and waning  Pain location: sacro-iliac  Pain quality: aching  Radiates to: right thigh  Pain - numeric: 3/10  Pain is: worse during the night  Aggravated by: position  Stiffness is present: in the morning  bladder incontinence: No  bowel incontinence: No  leg pain: Yes  paresis: No  paresthesias: No  perianal numbness: No  tingling: No  weight loss: No  Risk factors: menopause

## 2017-07-29 ENCOUNTER — HOSPITAL ENCOUNTER (OUTPATIENT)
Dept: MAMMOGRAPHY | Facility: HOSPITAL | Age: 57
Discharge: HOME | End: 2017-07-29
Attending: OBSTETRICS & GYNECOLOGY | Admitting: OBSTETRICS & GYNECOLOGY

## 2017-07-29 DIAGNOSIS — Z12.31 VISIT FOR SCREENING MAMMOGRAM: ICD-10-CM

## 2017-07-29 PROCEDURE — G0202 SCR MAMMO BI INCL CAD: HCPCS

## 2018-03-05 RX ORDER — BACLOFEN 10 MG/1
TABLET ORAL
Qty: 90 TABLET | Refills: 2 | OUTPATIENT
Start: 2018-03-05

## 2018-06-22 DIAGNOSIS — Z00.00 ROUTINE HEALTH MAINTENANCE: Primary | ICD-10-CM

## 2018-06-22 DIAGNOSIS — Z11.59 NEED FOR HEPATITIS C SCREENING TEST: ICD-10-CM

## 2018-06-26 ENCOUNTER — CLINICAL SUPPORT (OUTPATIENT)
Dept: FAMILY MEDICINE CLINIC | Facility: CLINIC | Age: 58
End: 2018-06-26

## 2018-06-26 DIAGNOSIS — Z00.00 ROUTINE HEALTH MAINTENANCE: Primary | ICD-10-CM

## 2018-06-26 PROCEDURE — 85025 COMPLETE CBC W/AUTO DIFF WBC: CPT | Performed by: INTERNAL MEDICINE

## 2018-06-26 PROCEDURE — 71046 X-RAY EXAM CHEST 2 VIEWS: CPT | Performed by: INTERNAL MEDICINE

## 2018-06-26 PROCEDURE — 93000 ELECTROCARDIOGRAM COMPLETE: CPT | Performed by: INTERNAL MEDICINE

## 2018-06-27 LAB
ALBUMIN SERPL-MCNC: 4.5 G/DL (ref 3.5–5.2)
ALBUMIN/GLOB SERPL: 2 G/DL
ALP SERPL-CCNC: 92 U/L (ref 39–117)
ALT SERPL-CCNC: 20 U/L (ref 1–33)
APPEARANCE UR: ABNORMAL
AST SERPL-CCNC: 22 U/L (ref 1–32)
BILIRUB SERPL-MCNC: 0.7 MG/DL (ref 0.1–1.2)
BILIRUB UR QL STRIP: NEGATIVE
BUN SERPL-MCNC: 12 MG/DL (ref 6–20)
BUN/CREAT SERPL: 13.2 (ref 7–25)
CALCIUM SERPL-MCNC: 9.6 MG/DL (ref 8.6–10.5)
CHLORIDE SERPL-SCNC: 102 MMOL/L (ref 98–107)
CHOLEST SERPL-MCNC: 189 MG/DL (ref 0–200)
CO2 SERPL-SCNC: 26.6 MMOL/L (ref 22–29)
COLOR UR: YELLOW
CREAT SERPL-MCNC: 0.91 MG/DL (ref 0.57–1)
GLOBULIN SER CALC-MCNC: 2.3 GM/DL
GLUCOSE SERPL-MCNC: 88 MG/DL (ref 65–99)
GLUCOSE UR QL: NEGATIVE
HCV AB S/CO SERPL IA: <0.1 S/CO RATIO (ref 0–0.9)
HCV AB SERPL QL IA: NORMAL
HDLC SERPL-MCNC: 81 MG/DL (ref 40–60)
HGB UR QL STRIP: NEGATIVE
KETONES UR QL STRIP: NEGATIVE
LDLC SERPL CALC-MCNC: 90 MG/DL (ref 0–100)
LDLC/HDLC SERPL: 1.12 {RATIO}
LEUKOCYTE ESTERASE UR QL STRIP: NEGATIVE
NITRITE UR QL STRIP: NEGATIVE
PH UR STRIP: 7.5 [PH] (ref 5–8)
POTASSIUM SERPL-SCNC: 4.4 MMOL/L (ref 3.5–5.2)
PROT SERPL-MCNC: 6.8 G/DL (ref 6–8.5)
PROT UR QL STRIP: NEGATIVE
SODIUM SERPL-SCNC: 141 MMOL/L (ref 136–145)
SP GR UR: 1.02 (ref 1–1.03)
TRIGL SERPL-MCNC: 88 MG/DL (ref 0–150)
UROBILINOGEN UR STRIP-MCNC: ABNORMAL MG/DL
VLDLC SERPL CALC-MCNC: 17.6 MG/DL (ref 5–40)

## 2018-07-02 ENCOUNTER — OFFICE VISIT (OUTPATIENT)
Dept: FAMILY MEDICINE CLINIC | Facility: CLINIC | Age: 58
End: 2018-07-02

## 2018-07-02 VITALS
SYSTOLIC BLOOD PRESSURE: 118 MMHG | HEART RATE: 56 BPM | DIASTOLIC BLOOD PRESSURE: 72 MMHG | RESPIRATION RATE: 18 BRPM | WEIGHT: 175 LBS | BODY MASS INDEX: 28.12 KG/M2 | OXYGEN SATURATION: 98 % | HEIGHT: 66 IN

## 2018-07-02 DIAGNOSIS — E04.1 THYROID NODULE: ICD-10-CM

## 2018-07-02 DIAGNOSIS — R94.31 ABNORMAL EKG: ICD-10-CM

## 2018-07-02 DIAGNOSIS — E78.5 HYPERLIPIDEMIA, UNSPECIFIED HYPERLIPIDEMIA TYPE: ICD-10-CM

## 2018-07-02 DIAGNOSIS — Z00.00 ROUTINE HEALTH MAINTENANCE: Primary | ICD-10-CM

## 2018-07-02 PROCEDURE — 71046 X-RAY EXAM CHEST 2 VIEWS: CPT | Performed by: INTERNAL MEDICINE

## 2018-07-02 PROCEDURE — 99396 PREV VISIT EST AGE 40-64: CPT | Performed by: INTERNAL MEDICINE

## 2018-07-02 RX ORDER — AMITRIPTYLINE HYDROCHLORIDE 10 MG/1
TABLET, FILM COATED ORAL
COMMUNITY
Start: 2018-06-20 | End: 2021-12-03

## 2018-07-02 RX ORDER — ESTRADIOL 0.1 MG/D
FILM, EXTENDED RELEASE TRANSDERMAL
COMMUNITY
Start: 2018-06-20

## 2018-07-02 NOTE — PROGRESS NOTES
Subjective   Cristina Larose is a 57 y.o. female. Patient is here today for   Chief Complaint   Patient presents with   • Annual Exam     CPE          Vitals:    07/02/18 1439   BP: 118/72   Pulse: 56   Resp: 18   SpO2: 98%       The following portions of the patient's history were reviewed and updated as appropriate: allergies, current medications, past family history, past medical history, past social history, past surgical history and problem list.    Past Medical History:   Diagnosis Date   • Gallstone    • GERD (gastroesophageal reflux disease)    • Hearing loss in right ear    • History of kidney stones    • HL (hearing loss) 2014    Right ear has constant ringing   • Insomnia    • Irritable bowel syndrome    • Kidney stone 2015    One attack several years ago   • Low back pain    • PONV (postoperative nausea and vomiting)    • Ringing of ears    • Scoliosis     Scoliosis      Allergies   Allergen Reactions   • Roxicet [Oxycodone-Acetaminophen] Nausea Only and Other (See Comments)     Headache       Social History     Social History   • Marital status:      Spouse name: N/A   • Number of children: N/A   • Years of education: N/A     Occupational History   • Not on file.     Social History Main Topics   • Smoking status: Never Smoker   • Smokeless tobacco: Never Used   • Alcohol use Yes     1 Glasses of wine per week      Comment: Drink maybe once a month   • Drug use: No   • Sexual activity: Yes     Partners: Male     Birth control/ protection: None      Comment: Hysterectomy     Other Topics Concern   • Not on file     Social History Narrative   • No narrative on file        Current Outpatient Prescriptions:   •  amitriptyline (ELAVIL) 10 MG tablet, , Disp: , Rfl:   •  atorvastatin (LIPITOR) 20 MG tablet, , Disp: , Rfl:   •  baclofen (LIORESAL) 10 MG tablet, Take 1 tablet by mouth 3 (Three) Times a Day., Disp: 90 tablet, Rfl: 3  •  BIOTIN PO, Take  by mouth., Disp: , Rfl:   •  Cholecalciferol (VITAMIN D  PO), Take 3 tablets by mouth every night., Disp: , Rfl:   •  Cyanocobalamin (B-12 PO), Take 1 tablet by mouth 3 (three) times a week., Disp: , Rfl:   •  estradiol (MINIVELLE, VIVELLE-DOT) 0.075 MG/24HR patch, , Disp: , Rfl:   •  estradiol (MINIVELLE, VIVELLE-DOT) 0.1 MG/24HR patch, , Disp: , Rfl:   •  Iron tablet, Take 3 tablets by mouth As Needed., Disp: , Rfl:   •  Linaclotide 145 MCG capsule, Take 145 mcg by mouth Daily. (Patient taking differently: Take 145 mcg by mouth As Needed.), Disp: 30 capsule, Rfl: 0  •  Magnesium 250 MG tablet, Take 200 mg by mouth As Needed., Disp: , Rfl:   •  Melatonin 10 MG capsule, Take 5 mg by mouth As Needed., Disp: , Rfl:   •  Omega-3 Fatty Acids (FISH OIL) 600 MG capsule, Take 600 mg by mouth Daily. HELD FOR SURGERY, Disp: 30 capsule, Rfl:   •  omeprazole (priLOSEC) 20 MG capsule, , Disp: , Rfl:   •  Unable to find, 1 each 1 (One) Time. [address]  Compound med for thyroid, Disp: , Rfl:   •  VALERIAN PO, Take 1,500 mg by mouth., Disp: , Rfl:      EKG abnormal.  Sinus bradycardia, left axis deviation, low voltage limb leads, nonspecific T wave changes, Q waves in inferior leads.    Objective   History of Present Illness and is here for an annual physical examination.  She has been healthy.  She has hyperlipidemia and is on atorvastatin 20 mg daily.  She also has scoliosis and has been doing physical therapy which is helped her back pain.  She eats healthy and exercises on a regular basis.  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  He sees her gynecologist annually.  She does complain of gastroesophageal reflux causing cough.  She takes omeprazole as needed.  She has a history of thyroid nodules and last had a thyroid ultrasound in 2016.  She was told she had  abnormal EKG in 2011 and underwent an exercise stress test.    Review of Systems   Constitutional: Negative.    Eyes:        Eye exam 2 years ago   Respiratory: Negative.    Cardiovascular: Negative.    Gastrointestinal:         Colonoscopy 2015   Genitourinary: Negative.    Musculoskeletal: Positive for back pain.   Skin:        Sees dermatologist   Neurological: Negative.    Psychiatric/Behavioral: Negative.        Physical Exam   Constitutional: She appears well-developed and well-nourished.   HENT:   Nose: Nose normal.   Mouth/Throat: Oropharynx is clear and moist.   Eyes: EOM are normal. Pupils are equal, round, and reactive to light.   Neck: Normal range of motion. No thyromegaly present.   Cardiovascular: Normal rate, regular rhythm, normal heart sounds and intact distal pulses.    Pulmonary/Chest: Effort normal and breath sounds normal.   Abdominal: Soft. Bowel sounds are normal.   Lymphadenopathy:     She has no cervical adenopathy.   Neurological: She is alert.   Skin: Skin is warm and dry.   Psychiatric: She has a normal mood and affect. Her behavior is normal. Judgment and thought content normal.   Vitals reviewed.      ASSESSMENT     Problem List Items Addressed This Visit        Cardiovascular and Mediastinum    Hyperlipidemia    Abnormal EKG    Relevant Orders    Adult Transthoracic Echo Complete W/ Cont if Necessary Per Protocol       Endocrine    Thyroid nodule    Relevant Orders    US Thyroid      Other Visit Diagnoses     Routine health maintenance    -  Primary    Relevant Orders    XR Chest PA & Lateral (Completed)          PLAN  Patient Instructions   Blood pressure is normal.  Total cholesterol is 189.  HDL is excellent at 81 and LDL is 90.  A complete blood count, comprehensive metabolic panel, urinalysis, are normal.  Hepatitis C is negative.  Chest x-ray shows no active disease and scoliosis.  EKG is abnormal most likely due to body habitus.  We will check a thyroid ultrasound and echocardiogram.  Suggest vascular screening tests.  Suggest stopping atorvastatin as you're [place] risk is 1% based on your cholesterol levels prior to starting  atorvastatin.      Return in about 6 months (around 1/2/2019) for labs  lipid.

## 2018-07-02 NOTE — PATIENT INSTRUCTIONS
Blood pressure is normal.  Total cholesterol is 189.  HDL is excellent at 81 and LDL is 90.  A complete blood count, comprehensive metabolic panel, urinalysis, are normal.  Hepatitis C is negative.  Chest x-ray shows no active disease and scoliosis.  EKG is abnormal most likely due to body habitus.  We will check a thyroid ultrasound and echocardiogram.  Suggest vascular screening tests.  Suggest stopping atorvastatin as you're [place] risk is 1% based on your cholesterol levels prior to starting  atorvastatin.

## 2018-07-10 ENCOUNTER — TRANSCRIBE ORDERS (OUTPATIENT)
Dept: ADMINISTRATIVE | Facility: HOSPITAL | Age: 58
End: 2018-07-10

## 2018-07-10 DIAGNOSIS — Z12.39 SCREENING BREAST EXAMINATION: Primary | ICD-10-CM

## 2018-07-25 ENCOUNTER — HOSPITAL ENCOUNTER (OUTPATIENT)
Dept: CARDIOLOGY | Facility: HOSPITAL | Age: 58
Discharge: HOME | End: 2018-07-25

## 2018-07-25 ENCOUNTER — HOSPITAL ENCOUNTER (OUTPATIENT)
Dept: ULTRASOUND IMAGING | Facility: HOSPITAL | Age: 58
Discharge: HOME | End: 2018-07-25
Admitting: INTERNAL MEDICINE

## 2018-07-25 VITALS
HEIGHT: 66 IN | SYSTOLIC BLOOD PRESSURE: 130 MMHG | BODY MASS INDEX: 28.12 KG/M2 | WEIGHT: 175 LBS | HEART RATE: 73 BPM | DIASTOLIC BLOOD PRESSURE: 80 MMHG

## 2018-07-25 LAB
ASCENDING AORTA: 2.8 CM
BH CV ECHO MEAS - ACS: 1.8 CM
BH CV ECHO MEAS - AO MAX PG (FULL): -2 MMHG
BH CV ECHO MEAS - AO MAX PG: 6.8 MMHG
BH CV ECHO MEAS - AO MEAN PG (FULL): -0.85 MMHG
BH CV ECHO MEAS - AO MEAN PG: 4.1 MMHG
BH CV ECHO MEAS - AO ROOT AREA (BSA CORRECTED): 1.6
BH CV ECHO MEAS - AO ROOT AREA: 6.8 CM^2
BH CV ECHO MEAS - AO ROOT DIAM: 2.9 CM
BH CV ECHO MEAS - AO V2 MAX: 130.3 CM/SEC
BH CV ECHO MEAS - AO V2 MEAN: 95.6 CM/SEC
BH CV ECHO MEAS - AO V2 VTI: 31 CM
BH CV ECHO MEAS - AVA(I,A): 3.1 CM^2
BH CV ECHO MEAS - AVA(I,D): 3.1 CM^2
BH CV ECHO MEAS - AVA(V,A): 3.4 CM^2
BH CV ECHO MEAS - AVA(V,D): 3.4 CM^2
BH CV ECHO MEAS - BSA(HAYCOCK): 1.9 M^2
BH CV ECHO MEAS - BSA: 1.9 M^2
BH CV ECHO MEAS - BZI_BMI: 28.2 KILOGRAMS/M^2
BH CV ECHO MEAS - BZI_METRIC_HEIGHT: 167.6 CM
BH CV ECHO MEAS - BZI_METRIC_WEIGHT: 79.4 KG
BH CV ECHO MEAS - CONTRAST EF (2CH): 69.1 ML/M^2
BH CV ECHO MEAS - CONTRAST EF 4CH: 59.7 ML/M^2
BH CV ECHO MEAS - EDV(MOD-SP2): 94 ML
BH CV ECHO MEAS - EDV(MOD-SP4): 72 ML
BH CV ECHO MEAS - EDV(TEICH): 87.3 ML
BH CV ECHO MEAS - EF(CUBED): 72.3 %
BH CV ECHO MEAS - EF(MOD-BP): 65 %
BH CV ECHO MEAS - EF(MOD-SP2): 69.1 %
BH CV ECHO MEAS - EF(MOD-SP4): 59.7 %
BH CV ECHO MEAS - EF(TEICH): 64.3 %
BH CV ECHO MEAS - ESV(MOD-SP2): 29 ML
BH CV ECHO MEAS - ESV(MOD-SP4): 29 ML
BH CV ECHO MEAS - ESV(TEICH): 31.2 ML
BH CV ECHO MEAS - FS: 34.8 %
BH CV ECHO MEAS - IVS/LVPW: 1
BH CV ECHO MEAS - IVSD: 1 CM
BH CV ECHO MEAS - LAT PEAK E' VEL: 11 CM/SEC
BH CV ECHO MEAS - LV DIASTOLIC VOL/BSA (35-75): 38.1 ML/M^2
BH CV ECHO MEAS - LV MASS(C)D: 147 GRAMS
BH CV ECHO MEAS - LV MASS(C)DI: 77.8 GRAMS/M^2
BH CV ECHO MEAS - LV MAX PG: 8.8 MMHG
BH CV ECHO MEAS - LV MEAN PG: 5 MMHG
BH CV ECHO MEAS - LV SYSTOLIC VOL/BSA (12-30): 15.3 ML/M^2
BH CV ECHO MEAS - LV V1 MAX: 148.2 CM/SEC
BH CV ECHO MEAS - LV V1 MEAN: 104.6 CM/SEC
BH CV ECHO MEAS - LV V1 VTI: 32 CM
BH CV ECHO MEAS - LVIDD: 4.4 CM
BH CV ECHO MEAS - LVIDS: 2.9 CM
BH CV ECHO MEAS - LVLD AP2: 7.7 CM
BH CV ECHO MEAS - LVLD AP4: 6.7 CM
BH CV ECHO MEAS - LVLS AP2: 5.9 CM
BH CV ECHO MEAS - LVLS AP4: 6 CM
BH CV ECHO MEAS - LVOT AREA (M): 3.1 CM^2
BH CV ECHO MEAS - LVOT AREA: 3 CM^2
BH CV ECHO MEAS - LVOT DIAM: 2 CM
BH CV ECHO MEAS - LVPWD: 1 CM
BH CV ECHO MEAS - MED PEAK E' VEL: 9 CM/SEC
BH CV ECHO MEAS - MV A DUR: 0.11 SEC
BH CV ECHO MEAS - MV A MAX VEL: 82 CM/SEC
BH CV ECHO MEAS - MV DEC SLOPE: 360.3 CM/SEC^2
BH CV ECHO MEAS - MV DEC TIME: 0.17 SEC
BH CV ECHO MEAS - MV E MAX VEL: 67.9 CM/SEC
BH CV ECHO MEAS - MV E/A: 0.83
BH CV ECHO MEAS - MV MAX PG: 2 MMHG
BH CV ECHO MEAS - MV MEAN PG: 0.39 MMHG
BH CV ECHO MEAS - MV P1/2T MAX VEL: 68.4 CM/SEC
BH CV ECHO MEAS - MV P1/2T: 55.6 MSEC
BH CV ECHO MEAS - MV V2 MAX: 70 CM/SEC
BH CV ECHO MEAS - MV V2 MEAN: 23.9 CM/SEC
BH CV ECHO MEAS - MV V2 VTI: 20.6 CM
BH CV ECHO MEAS - MVA P1/2T LCG: 3.2 CM^2
BH CV ECHO MEAS - MVA(P1/2T): 4 CM^2
BH CV ECHO MEAS - MVA(VTI): 4.7 CM^2
BH CV ECHO MEAS - PA ACC TIME: 0.15 SEC
BH CV ECHO MEAS - PA MAX PG (FULL): 0.47 MMHG
BH CV ECHO MEAS - PA MAX PG: 4.3 MMHG
BH CV ECHO MEAS - PA PR(ACCEL): 12.5 MMHG
BH CV ECHO MEAS - PA V2 MAX: 104.2 CM/SEC
BH CV ECHO MEAS - PULM A REVS DUR: 0.14 SEC
BH CV ECHO MEAS - PULM A REVS VEL: 27.6 CM/SEC
BH CV ECHO MEAS - PULM DIAS VEL: 31.5 CM/SEC
BH CV ECHO MEAS - PULM S/D: 1.4
BH CV ECHO MEAS - PULM SYS VEL: 44.1 CM/SEC
BH CV ECHO MEAS - PVA(V,A): 2.3 CM^2
BH CV ECHO MEAS - PVA(V,D): 2.3 CM^2
BH CV ECHO MEAS - QP/QS: 0.46
BH CV ECHO MEAS - RV MAX PG: 3.9 MMHG
BH CV ECHO MEAS - RV MEAN PG: 2 MMHG
BH CV ECHO MEAS - RV V1 MAX: 98.5 CM/SEC
BH CV ECHO MEAS - RV V1 MEAN: 65.6 CM/SEC
BH CV ECHO MEAS - RV V1 VTI: 18.5 CM
BH CV ECHO MEAS - RVOT AREA: 2.4 CM^2
BH CV ECHO MEAS - RVOT DIAM: 1.7 CM
BH CV ECHO MEAS - SI(AO): 111.4 ML/M^2
BH CV ECHO MEAS - SI(CUBED): 32.4 ML/M^2
BH CV ECHO MEAS - SI(LVOT): 51.2 ML/M^2
BH CV ECHO MEAS - SI(MOD-SP2): 34.4 ML/M^2
BH CV ECHO MEAS - SI(MOD-SP4): 22.8 ML/M^2
BH CV ECHO MEAS - SI(TEICH): 29.7 ML/M^2
BH CV ECHO MEAS - SUP REN AO DIAM: 2.2 CM
BH CV ECHO MEAS - SV(AO): 210.5 ML
BH CV ECHO MEAS - SV(CUBED): 61.3 ML
BH CV ECHO MEAS - SV(LVOT): 96.7 ML
BH CV ECHO MEAS - SV(MOD-SP2): 65 ML
BH CV ECHO MEAS - SV(MOD-SP4): 43 ML
BH CV ECHO MEAS - SV(RVOT): 44.1 ML
BH CV ECHO MEAS - SV(TEICH): 56.1 ML
BH CV ECHO MEAS - TAPSE (>1.6): 2.2 CM2
BH CV ECHO MEAS - TR MAX VEL: 229.2 CM/SEC
BH CV ECHO MEASUREMENTS AVERAGE E/E' RATIO: 6.79
BH CV XLRA - RV BASE: 2.6 CM
BH CV XLRA - TDI S': 12 CM/SEC
LEFT ATRIUM VOLUME INDEX: 19 ML/M2
LV EF 2D ECHO EST: 65 %
SINUS: 2.8 CM
STJ: 2.6 CM

## 2018-07-25 PROCEDURE — 93306 TTE W/DOPPLER COMPLETE: CPT | Performed by: INTERNAL MEDICINE

## 2018-07-25 PROCEDURE — 93306 TTE W/DOPPLER COMPLETE: CPT

## 2018-07-25 PROCEDURE — 76536 US EXAM OF HEAD AND NECK: CPT

## 2018-07-27 ENCOUNTER — TELEPHONE (OUTPATIENT)
Dept: FAMILY MEDICINE CLINIC | Facility: CLINIC | Age: 58
End: 2018-07-27

## 2018-07-27 NOTE — TELEPHONE ENCOUNTER
I called patient, left voicemail to call us back.  Per Dr. Hernandez, her Thyroid US was normal.

## 2018-09-29 ENCOUNTER — HOSPITAL ENCOUNTER (OUTPATIENT)
Dept: MAMMOGRAPHY | Facility: HOSPITAL | Age: 58
Discharge: HOME | End: 2018-09-29
Admitting: NURSE PRACTITIONER

## 2018-09-29 DIAGNOSIS — Z12.39 SCREENING BREAST EXAMINATION: ICD-10-CM

## 2018-09-29 PROCEDURE — 77063 BREAST TOMOSYNTHESIS BI: CPT

## 2018-09-29 PROCEDURE — 77067 SCR MAMMO BI INCL CAD: CPT

## 2019-01-03 DIAGNOSIS — E78.5 HYPERLIPIDEMIA, UNSPECIFIED HYPERLIPIDEMIA TYPE: Primary | ICD-10-CM

## 2019-01-08 LAB
CHOLEST SERPL-MCNC: 227 MG/DL (ref 0–200)
HDLC SERPL-MCNC: 79 MG/DL (ref 40–60)
LDLC SERPL CALC-MCNC: 125 MG/DL (ref 0–100)
LDLC/HDLC SERPL: 1.58 {RATIO}
TRIGL SERPL-MCNC: 114 MG/DL (ref 0–150)
VLDLC SERPL CALC-MCNC: 22.8 MG/DL (ref 5–40)

## 2019-01-15 ENCOUNTER — OFFICE VISIT (OUTPATIENT)
Dept: FAMILY MEDICINE CLINIC | Facility: CLINIC | Age: 59
End: 2019-01-15

## 2019-01-15 VITALS
HEART RATE: 52 BPM | HEIGHT: 66 IN | BODY MASS INDEX: 28.61 KG/M2 | RESPIRATION RATE: 18 BRPM | DIASTOLIC BLOOD PRESSURE: 80 MMHG | SYSTOLIC BLOOD PRESSURE: 122 MMHG | WEIGHT: 178 LBS

## 2019-01-15 DIAGNOSIS — K21.9 GASTROESOPHAGEAL REFLUX DISEASE WITHOUT ESOPHAGITIS: ICD-10-CM

## 2019-01-15 DIAGNOSIS — E78.5 HYPERLIPIDEMIA, UNSPECIFIED HYPERLIPIDEMIA TYPE: Primary | ICD-10-CM

## 2019-01-15 PROCEDURE — 99214 OFFICE O/P EST MOD 30 MIN: CPT | Performed by: INTERNAL MEDICINE

## 2019-01-15 NOTE — PROGRESS NOTES
Subjective   Cristina Larose is a 58 y.o. female. Patient is here today for   Chief Complaint   Patient presents with   • Hyperlipidemia          Vitals:    01/15/19 1540   BP: 122/80   Pulse: 52   Resp: 18       The following portions of the patient's history were reviewed and updated as appropriate: allergies, current medications, past family history, past medical history, past social history, past surgical history and problem list.    Past Medical History:   Diagnosis Date   • Gallstone    • GERD (gastroesophageal reflux disease)    • Hearing loss in right ear    • History of kidney stones    • HL (hearing loss) 2014    Right ear has constant ringing   • Insomnia    • Irritable bowel syndrome    • Kidney stone 2015    One attack several years ago   • Low back pain    • PONV (postoperative nausea and vomiting)    • Ringing of ears    • Scoliosis     Scoliosis      Allergies   Allergen Reactions   • Roxicet [Oxycodone-Acetaminophen] Nausea Only and Other (See Comments)     Headache       Social History     Socioeconomic History   • Marital status:      Spouse name: Not on file   • Number of children: Not on file   • Years of education: Not on file   • Highest education level: Not on file   Social Needs   • Financial resource strain: Not on file   • Food insecurity - worry: Not on file   • Food insecurity - inability: Not on file   • Transportation needs - medical: Not on file   • Transportation needs - non-medical: Not on file   Occupational History   • Not on file   Tobacco Use   • Smoking status: Never Smoker   • Smokeless tobacco: Never Used   Substance and Sexual Activity   • Alcohol use: Yes     Types: 1 Glasses of wine per week     Comment: Drink maybe once a month   • Drug use: No   • Sexual activity: Yes     Partners: Male     Birth control/protection: None     Comment: Hysterectomy   Other Topics Concern   • Not on file   Social History Narrative   • Not on file        Current Outpatient Medications:    •  amitriptyline (ELAVIL) 10 MG tablet, , Disp: , Rfl:   •  atorvastatin (LIPITOR) 20 MG tablet, , Disp: , Rfl:   •  Cholecalciferol (VITAMIN D PO), Take 3 tablets by mouth every night., Disp: , Rfl:   •  Cyanocobalamin (B-12 PO), Take 1 tablet by mouth 3 (three) times a week., Disp: , Rfl:   •  estradiol (MINIVELLE, VIVELLE-DOT) 0.1 MG/24HR patch, , Disp: , Rfl:   •  Magnesium 250 MG tablet, Take 200 mg by mouth As Needed., Disp: , Rfl:      Objective   History of Present Illness Cristina is here for lab follow-up.  She has hyperlipidemia and is on atorvastatin 20 mg daily.  She also has gastroesophageal reflux that is symptomatic and is on omeprazole 20 mg daily.  She has some confusion as to what is the best diet to be on.  She tries to eat healthy and exercises on a regular basis.  She does have work stress.  She does complain of fatigue.    Review of Systems   Constitutional: Positive for fatigue.   Respiratory: Negative.    Cardiovascular: Negative.    Neurological: Negative.    Psychiatric/Behavioral: Negative.        Physical Exam   Constitutional: She appears well-developed and well-nourished.   Neck: Carotid bruit is not present.   Cardiovascular: Normal rate, regular rhythm and normal heart sounds.   Pulmonary/Chest: Effort normal and breath sounds normal.   Vitals reviewed.      ASSESSMENT     Problem List Items Addressed This Visit        Cardiovascular and Mediastinum    Hyperlipidemia - Primary       Digestive    Gastroesophageal reflux disease without esophagitis          PLAN  Patient Instructions   Blood pressure is close to normal.  Normal is less than 120/80.  Total cholesterol has increased to 227.  HDL is excellent at 79 and LDL is mildly elevated at 125.  Suggest getting vascular screening tests.  Discussed diet and exercise.  Also discussed GERD measures and taking Prilosec or Nexium one half-hour before breakfast.  If GERD symptoms continue will refer to gastroenterology for  endoscopy.      Return in about 6 months (around 7/15/2019) for labsCMP,LIPID.

## 2019-01-15 NOTE — PATIENT INSTRUCTIONS
Blood pressure is close to normal.  Normal is less than 120/80.  Total cholesterol has increased to 227.  HDL is excellent at 79 and LDL is mildly elevated at 125.  Suggest getting vascular screening tests.  Discussed diet and exercise.  Also discussed GERD measures and taking Prilosec or Nexium one half-hour before breakfast.  If GERD symptoms continue will refer to gastroenterology for endoscopy.

## 2019-01-16 ENCOUNTER — TELEPHONE (OUTPATIENT)
Dept: FAMILY MEDICINE CLINIC | Facility: CLINIC | Age: 59
End: 2019-01-16

## 2019-01-16 DIAGNOSIS — K20.90 ESOPHAGITIS: ICD-10-CM

## 2019-01-16 DIAGNOSIS — K21.9 GASTROESOPHAGEAL REFLUX DISEASE WITHOUT ESOPHAGITIS: Primary | ICD-10-CM

## 2019-01-16 NOTE — TELEPHONE ENCOUNTER
Order put in     ----- Message from Jose Roberto Nicholas MA sent at 1/16/2019 12:57 PM EST -----      ----- Message -----  From: Eleanor Reilly RegSched Rep  Sent: 1/16/2019  11:14 AM  To: Jose Roberto Nicholas MA    PT CALLED IN AND SAID DR SUGGESTED SHE SEE A GASTRO AT YESTERDAY'S APPT AND SHE WOULD LIKE TO GO AHEAD AND GET THAT GOING.  PLEASE PUT IN ORDER FOR UP GI.  IF YOU HAVE ANY QUESTIONS PLEASE CONTACT PT -[phone]

## 2019-01-17 ENCOUNTER — TELEPHONE (OUTPATIENT)
Dept: FAMILY MEDICINE CLINIC | Facility: CLINIC | Age: 59
End: 2019-01-17

## 2019-01-17 RX ORDER — PANTOPRAZOLE SODIUM 40 MG/1
40 TABLET, DELAYED RELEASE ORAL DAILY
Qty: 90 TABLET | Refills: 1 | Status: SHIPPED | OUTPATIENT
Start: 2019-01-17 | End: 2019-07-30

## 2019-01-17 RX ORDER — PANTOPRAZOLE SODIUM 40 MG/1
40 TABLET, DELAYED RELEASE ORAL DAILY
Qty: 30 TABLET | Refills: 5 | Status: SHIPPED | OUTPATIENT
Start: 2019-01-17 | End: 2019-01-17 | Stop reason: SDUPTHER

## 2019-01-17 NOTE — TELEPHONE ENCOUNTER
Called patient and left voicemail,per Dr Schmitt he would like for her to take Pantoprazole 40mg daily.   Rx sent to pharmacy      ----- Message from Eliseo العلي Rep sent at 1/16/2019 11:09 AM EST -----  PT CALLED IN TO INFORM DR WHAT MEDICINE INSURANCE WILL COVER IN PLACE OF OMEPRAZOLE.  PT ADVISED THAT  [employer] WILL COVER THE FOLLOW MEDICINES  (HAS TO BE A 90 DAY SUPPLY):    EFOMERAZOLE    LANSOPRAZOLE    PANTOPRAZOLE    RABEPRAZOLE    PLEASE CHECK WITH  AND SEE WHAT HE WOULD LIKE TO DO.  PLEASE SEND SCRIPT TO [facility] ON [address].  THEN PLEASE CONTACT PT AND ADVISE ON WHAT  HAS SENT OVER -[phone]

## 2019-02-22 ENCOUNTER — TRANSCRIBE ORDERS (OUTPATIENT)
Dept: ADMINISTRATIVE | Facility: HOSPITAL | Age: 59
End: 2019-02-22

## 2019-02-22 DIAGNOSIS — Z13.9 VISIT FOR SCREENING: Primary | ICD-10-CM

## 2019-03-04 ENCOUNTER — HOSPITAL ENCOUNTER (OUTPATIENT)
Dept: CARDIOLOGY | Facility: HOSPITAL | Age: 59
Discharge: HOME | End: 2019-03-04
Admitting: INTERNAL MEDICINE

## 2019-03-04 VITALS
WEIGHT: 174 LBS | DIASTOLIC BLOOD PRESSURE: 75 MMHG | BODY MASS INDEX: 27.97 KG/M2 | SYSTOLIC BLOOD PRESSURE: 120 MMHG | HEART RATE: 56 BPM | HEIGHT: 66 IN

## 2019-03-04 DIAGNOSIS — Z13.9 VISIT FOR SCREENING: ICD-10-CM

## 2019-03-04 LAB
BH CV ECHO MEAS - DIST AO DIAM: 1.46 CM
BH CV VAS BP LEFT ARM: NORMAL MMHG
BH CV VAS BP RIGHT ARM: NORMAL MMHG
BH CV XLRA MEAS - MID AO DIAM: 1.68 CM
BH CV XLRA MEAS - PAD LEFT ABI DP: 1.08
BH CV XLRA MEAS - PAD LEFT ABI PT: 1.16
BH CV XLRA MEAS - PAD LEFT ARM: 117 MMHG
BH CV XLRA MEAS - PAD LEFT LEG DP: 130 MMHG
BH CV XLRA MEAS - PAD LEFT LEG PT: 140 MMHG
BH CV XLRA MEAS - PAD RIGHT ABI DP: 1.08
BH CV XLRA MEAS - PAD RIGHT ABI PT: 1.16
BH CV XLRA MEAS - PAD RIGHT ARM: 120 MMHG
BH CV XLRA MEAS - PAD RIGHT LEG DP: 130 MMHG
BH CV XLRA MEAS - PAD RIGHT LEG PT: 140 MMHG
BH CV XLRA MEAS - PROX AO DIAM: 1.87 CM
BH CV XLRA MEAS LEFT ICA/CCA RATIO: 1.46
BH CV XLRA MEAS LEFT MID CCA PSV: NORMAL CM/SEC
BH CV XLRA MEAS LEFT MID ICA PSV: NORMAL CM/SEC
BH CV XLRA MEAS LEFT PROX ECA PSV: NORMAL CM/SEC
BH CV XLRA MEAS RIGHT ICA/CCA RATIO: 1.24
BH CV XLRA MEAS RIGHT MID CCA PSV: NORMAL CM/SEC
BH CV XLRA MEAS RIGHT MID ICA PSV: NORMAL CM/SEC
BH CV XLRA MEAS RIGHT PROX ECA PSV: NORMAL CM/SEC

## 2019-03-04 PROCEDURE — 93799 UNLISTED CV SVC/PROCEDURE: CPT

## 2019-04-09 ENCOUNTER — OFFICE VISIT (OUTPATIENT)
Dept: GASTROENTEROLOGY | Facility: CLINIC | Age: 59
End: 2019-04-09

## 2019-04-09 VITALS
SYSTOLIC BLOOD PRESSURE: 112 MMHG | WEIGHT: 177 LBS | HEIGHT: 66 IN | BODY MASS INDEX: 28.45 KG/M2 | DIASTOLIC BLOOD PRESSURE: 72 MMHG | TEMPERATURE: 98.4 F

## 2019-04-09 DIAGNOSIS — K21.9 GASTROESOPHAGEAL REFLUX DISEASE, ESOPHAGITIS PRESENCE NOT SPECIFIED: Primary | ICD-10-CM

## 2019-04-09 PROBLEM — R13.19 ESOPHAGEAL DYSPHAGIA: Status: ACTIVE | Noted: 2019-04-09

## 2019-04-09 PROCEDURE — 99203 OFFICE O/P NEW LOW 30 MIN: CPT | Performed by: INTERNAL MEDICINE

## 2019-04-09 NOTE — PROGRESS NOTES
Chief Complaint   Patient presents with   • Heartburn     Cristina Larose is a 58 y.o. female who presents with heartburn.    She complains of heartburn - often happens in the morning and day - never at night.  She has cut out etoh (not a heavy drinker) and decreasing size of her meals and having earlier dinner.  She clears her throat constantly.   She feels acid brash.  She is feeling some burning at the base of her chest.  No trouble swallowing.      EGD 2014 - La Class A esophagitis, no HH mentioned (Dr. Petty)  HPI  Past Medical History:   Diagnosis Date   • Gallstone    • GERD (gastroesophageal reflux disease)    • Hearing loss in right ear    • History of kidney stones    • HL (hearing loss) 2014    Right ear has constant ringing   • Hyperlipidemia 2018    physical   • Insomnia    • Irritable bowel syndrome    • Kidney stone 2015    One attack several years ago   • Lactose intolerance     I do better when drinking lactose free milk   • Low back pain    • PONV (postoperative nausea and vomiting)    • Ringing of ears    • Scoliosis     Scoliosis     Past Surgical History:   Procedure Laterality Date   • BUNIONECTOMY Right 2003    bunionectomy   • CHOLECYSTECTOMY     • CHOLECYSTECTOMY WITH INTRAOPERATIVE CHOLANGIOGRAM N/A 9/29/2016    Procedure: CHOLECYSTECTOMY LAPAROSCOPIC INTRAOPERATIVE CHOLANGIOGRAM;  Surgeon: Adeline Kumar MD;  [place] [facility] [place] [facility] [place]  Service:    • COLONOSCOPY  11/14/2014    Dr. Davila, diverticulosis, IH, one HP polyp   • ENDOSCOPY  06/05/2014    LA Grade A reflux esophagitis, erosive gastropathy   • HYSTERECTOMY  1999    Dr. Friend   • INCISION AND DRAINAGE EXTERNAL EAR      left   • PARTIAL HYSTERECTOMY  1999    Left overy in tack       Current Outpatient Medications:   •  amitriptyline (ELAVIL) 10 MG tablet, , Disp: , Rfl:   •  atorvastatin (LIPITOR) 20 MG tablet, , Disp: , Rfl:   •  Cholecalciferol (VITAMIN D PO), Take 3 tablets by mouth every night., Disp: , Rfl:   •   Cyanocobalamin (B-12 PO), Take 1 tablet by mouth 3 (three) times a week., Disp: , Rfl:   •  estradiol (MINIVELLE, VIVELLE-DOT) 0.1 MG/24HR patch, , Disp: , Rfl:   •  Magnesium 250 MG tablet, Take 200 mg by mouth As Needed., Disp: , Rfl:   •  pantoprazole (PROTONIX) 40 MG EC tablet, Take 1 tablet by mouth Daily., Disp: 90 tablet, Rfl: 1  Allergies   Allergen Reactions   • Roxicet [Oxycodone-Acetaminophen] Nausea Only and Other (See Comments)     Headache      Social History     Socioeconomic History   • Marital status:      Spouse name: Not on file   • Number of children: Not on file   • Years of education: Not on file   • Highest education level: Not on file   Tobacco Use   • Smoking status: Never Smoker   • Smokeless tobacco: Never Used   • Tobacco comment: never used   Substance and Sexual Activity   • Alcohol use: Yes     Types: 1 Glasses of wine per week     Comment: Drink maybe once a month   • Drug use: No   • Sexual activity: Yes     Partners: Male     Birth control/protection: None     Comment: Hysterectomy     Family History   Problem Relation Age of Onset   • Heart disease Mother    • Irritable bowel syndrome Mother    • Lung disease Father         chronic obstructive pulmonary disease   • COPD Father    • Colon polyps Father         developed in late life   • Cancer Sister         brain   • Early death Sister         Brain cancer   • Inflammatory bowel disease Maternal Grandmother         history of constipation   • Stomach cancer Sister         surgery to remove his stomach     Review of Systems   Constitutional: Negative for appetite change and unexpected weight change.   HENT: Negative for trouble swallowing.    Gastrointestinal: Negative for abdominal pain, nausea and vomiting.   All other systems reviewed and are negative.    Vitals:    04/09/19 1514   BP: 112/72   Temp: 98.4 °F (36.9 °C)         04/09/19  1514   Weight: 80.3 kg (177 lb)     Physical Exam   Constitutional: She appears  well-developed and well-nourished.   HENT:   Head: Normocephalic and atraumatic.   Eyes: No scleral icterus.   Pulmonary/Chest: Effort normal.   Abdominal: Soft. She exhibits no distension.   Neurological: She is alert.   Skin: Skin is warm and dry.   Psychiatric: She has a normal mood and affect.     No images are attached to the encounter.  No notes on file  Cristina was seen today for heartburn.    Diagnoses and all orders for this visit:    Gastroesophageal reflux disease, esophagitis presence not specified  -     Case Request; Standing  -     Case Request    Other orders  -     Follow Anesthesia Guidelines / Standing Orders; Future  -     Implement Anesthesia orders day of procedure.; Standing  -     Obtain informed consent; Standing    Plan:  - rec egd for further eval of worsening symptoms  - continue pantoprazole - stressed the importance of taking this prior to meals  - continue gerd diet modification

## 2019-04-10 ENCOUNTER — ANESTHESIA (OUTPATIENT)
Dept: GASTROENTEROLOGY | Facility: HOSPITAL | Age: 59
End: 2019-04-10

## 2019-04-10 ENCOUNTER — HOSPITAL ENCOUNTER (OUTPATIENT)
Facility: HOSPITAL | Age: 59
Setting detail: HOSPITAL OUTPATIENT SURGERY
Discharge: HOME | End: 2019-04-10
Attending: INTERNAL MEDICINE | Admitting: INTERNAL MEDICINE

## 2019-04-10 ENCOUNTER — ANESTHESIA EVENT (OUTPATIENT)
Dept: GASTROENTEROLOGY | Facility: HOSPITAL | Age: 59
End: 2019-04-10

## 2019-04-10 VITALS
OXYGEN SATURATION: 98 % | RESPIRATION RATE: 16 BRPM | BODY MASS INDEX: 28.42 KG/M2 | DIASTOLIC BLOOD PRESSURE: 65 MMHG | WEIGHT: 173.44 LBS | HEART RATE: 55 BPM | SYSTOLIC BLOOD PRESSURE: 105 MMHG | TEMPERATURE: 98.3 F

## 2019-04-10 DIAGNOSIS — K21.9 GASTROESOPHAGEAL REFLUX DISEASE, ESOPHAGITIS PRESENCE NOT SPECIFIED: ICD-10-CM

## 2019-04-10 DIAGNOSIS — R13.19 ESOPHAGEAL DYSPHAGIA: ICD-10-CM

## 2019-04-10 PROCEDURE — 88305 TISSUE EXAM BY PATHOLOGIST: CPT | Performed by: INTERNAL MEDICINE

## 2019-04-10 PROCEDURE — S0260 H&P FOR SURGERY: HCPCS | Performed by: INTERNAL MEDICINE

## 2019-04-10 PROCEDURE — 43239 EGD BIOPSY SINGLE/MULTIPLE: CPT | Performed by: INTERNAL MEDICINE

## 2019-04-10 PROCEDURE — 25010000002 PROPOFOL 10 MG/ML EMULSION: Performed by: ANESTHESIOLOGY

## 2019-04-10 RX ORDER — PROPOFOL 10 MG/ML
VIAL (ML) INTRAVENOUS AS NEEDED
Status: DISCONTINUED | OUTPATIENT
Start: 2019-04-10 | End: 2019-04-10 | Stop reason: SURG

## 2019-04-10 RX ORDER — PROPOFOL 10 MG/ML
VIAL (ML) INTRAVENOUS CONTINUOUS PRN
Status: DISCONTINUED | OUTPATIENT
Start: 2019-04-10 | End: 2019-04-10 | Stop reason: SURG

## 2019-04-10 RX ORDER — LIDOCAINE HYDROCHLORIDE 20 MG/ML
INJECTION, SOLUTION INFILTRATION; PERINEURAL AS NEEDED
Status: DISCONTINUED | OUTPATIENT
Start: 2019-04-10 | End: 2019-04-10 | Stop reason: SURG

## 2019-04-10 RX ORDER — SODIUM CHLORIDE, SODIUM LACTATE, POTASSIUM CHLORIDE, CALCIUM CHLORIDE 600; 310; 30; 20 MG/100ML; MG/100ML; MG/100ML; MG/100ML
1000 INJECTION, SOLUTION INTRAVENOUS CONTINUOUS
Status: DISCONTINUED | OUTPATIENT
Start: 2019-04-10 | End: 2019-04-10 | Stop reason: HOSPADM

## 2019-04-10 RX ADMIN — LIDOCAINE HYDROCHLORIDE 50 MG: 20 INJECTION, SOLUTION INFILTRATION; PERINEURAL at 11:27

## 2019-04-10 RX ADMIN — SODIUM CHLORIDE, POTASSIUM CHLORIDE, SODIUM LACTATE AND CALCIUM CHLORIDE 1000 ML: 600; 310; 30; 20 INJECTION, SOLUTION INTRAVENOUS at 10:26

## 2019-04-10 RX ADMIN — PROPOFOL 160 MG: 10 INJECTION, EMULSION INTRAVENOUS at 11:27

## 2019-04-10 RX ADMIN — PROPOFOL 140 MCG/KG/MIN: 10 INJECTION, EMULSION INTRAVENOUS at 11:31

## 2019-04-10 NOTE — ANESTHESIA POSTPROCEDURE EVALUATION
Patient: Cristina Larose    Procedure Summary     Date:  04/10/19 Room / Location:   [facility] /  [facility]    Anesthesia Start:  1122 Anesthesia Stop:  1140    Procedure:  ESOPHAGOGASTRODUODENOSCOPY with biopsies (N/A Esophagus) Diagnosis:       Gastroesophageal reflux disease, esophagitis presence not specified      Esophageal dysphagia      (Gastroesophageal reflux disease, esophagitis presence not specified [K21.9])      (Esophageal dysphagia [R13.10])    Surgeon:  Haley Davila MD Provider:  Marian Hess MD    Anesthesia Type:  MAC ASA Status:  2          Anesthesia Type: MAC  Last vitals  BP   133/93 (04/10/19 1017)   Temp   36.8 °C (98.3 °F) (04/10/19 1017)   Pulse   58 (04/10/19 1017)   Resp   20 (04/10/19 1017)     SpO2   99 % (04/10/19 1017)     Post Anesthesia Care and Evaluation    Patient location during evaluation: PACU  Patient participation: complete - patient participated  Level of consciousness: awake and alert  Pain management: adequate  Airway patency: patent  Anesthetic complications: No anesthetic complications  PONV Status: none  Cardiovascular status: acceptable  Respiratory status: acceptable  Hydration status: acceptable

## 2019-04-10 NOTE — ANESTHESIA PREPROCEDURE EVALUATION
Anesthesia Evaluation     Patient summary reviewed and Nursing notes reviewed   history of anesthetic complications: PONV  NPO Solid Status: > 8 hours  NPO Liquid Status: > 4 hours           Airway   Mallampati: II  TM distance: >3 FB  Neck ROM: full  No difficulty expected  Dental - normal exam     Pulmonary - negative pulmonary ROS and normal exam    breath sounds clear to auscultation  Cardiovascular - normal exam    Rhythm: regular  Rate: normal    (+) hyperlipidemia,       Neuro/Psych  (+) psychiatric history Anxiety,     GI/Hepatic/Renal/Endo    (+)  GERD,  renal disease stones,     Musculoskeletal         ROS comment: scoliosis  Abdominal  - normal exam   Substance History - negative use     OB/GYN negative ob/gyn ROS         Other - negative ROS                     Anesthesia Plan    ASA 2     MAC     intravenous induction   Anesthetic plan, all risks, benefits, and alternatives have been provided, discussed and informed consent has been obtained with: patient.

## 2019-04-10 NOTE — H&P
[facility]  Pre Procedure History & Physical    Chief Complaint:   gerd    Subjective     HPI:   Cristina Larose is a 58 y.o. female who presents with heartburn.     She complains of heartburn - often happens in the morning and day - never at night.  She has cut out etoh (not a heavy drinker) and decreasing size of her meals and having earlier dinner.  She clears her throat constantly.   She feels acid brash.  She is feeling some burning at the base of her chest.  No trouble swallowing.       EGD 2014 - La Class A esophagitis, no HH mentioned (Dr. Petty)        Past Medical History:   Past Medical History:   Diagnosis Date   • Gallstone    • GERD (gastroesophageal reflux disease)    • Hearing loss in right ear    • History of kidney stones    • HL (hearing loss) 2014    Right ear has constant ringing   • Hyperlipidemia 2018    physical   • Insomnia    • Irritable bowel syndrome    • Kidney stone 2015    One attack several years ago   • Lactose intolerance     I do better when drinking lactose free milk   • Low back pain    • PONV (postoperative nausea and vomiting)    • Ringing of ears    • Scoliosis     Scoliosis       Past Surgical History:  Past Surgical History:   Procedure Laterality Date   • BUNIONECTOMY Right 2003    bunionectomy   • CHOLECYSTECTOMY     • CHOLECYSTECTOMY WITH INTRAOPERATIVE CHOLANGIOGRAM N/A 9/29/2016    Procedure: CHOLECYSTECTOMY LAPAROSCOPIC INTRAOPERATIVE CHOLANGIOGRAM;  Surgeon: Adeline Kumar MD;  Location: [facility] [place];  Service:    • COLONOSCOPY  11/14/2014    Dr. Davila, diverticulosis, IH, one HP polyp   • ENDOSCOPY  06/05/2014    LA Grade A reflux esophagitis, erosive gastropathy   • HYSTERECTOMY  1999    Dr. Friend   • INCISION AND DRAINAGE EXTERNAL EAR      left   • PARTIAL HYSTERECTOMY  1999    Left overy in tack       Family History:  Family History   Problem Relation Age of Onset   • Heart disease Mother    • Irritable bowel syndrome Mother     • Lung disease Father         chronic obstructive pulmonary disease   • COPD Father    • Colon polyps Father         developed in late life   • Cancer Sister         brain   • Early death Sister         Brain cancer   • Inflammatory bowel disease Maternal Grandmother         history of constipation   • Stomach cancer Sister         surgery to remove his stomach       Social History:   reports that she has never smoked. She has never used smokeless tobacco. She reports that she drinks alcohol. She reports that she does not use drugs.    Medications:   Medications Prior to Admission   Medication Sig Dispense Refill Last Dose   • amitriptyline (ELAVIL) 10 MG tablet    4/9/2019 at Unknown time   • atorvastatin (LIPITOR) 20 MG tablet    4/9/2019 at Unknown time   • Cholecalciferol (VITAMIN D PO) Take 3 tablets by mouth every night.   4/9/2019 at Unknown time   • Cyanocobalamin (B-12 PO) Take 1 tablet by mouth 3 (three) times a week.   4/9/2019 at Unknown time   • estradiol (MINIVELLE, VIVELLE-DOT) 0.1 MG/24HR patch    4/9/2019 at Unknown time   • Magnesium 250 MG tablet Take 200 mg by mouth As Needed.   4/9/2019 at Unknown time   • pantoprazole (PROTONIX) 40 MG EC tablet Take 1 tablet by mouth Daily. 90 tablet 1 4/9/2019 at Unknown time       Allergies:  Roxicet [oxycodone-acetaminophen]    ROS:    Pertinent items are noted in HPI, all other systems reviewed and negative     Objective     Blood pressure 133/93, pulse 58, temperature 98.3 °F (36.8 °C), temperature source Oral, resp. rate 20, weight 78.7 kg (173 lb 7 oz), SpO2 99 %.    Physical Exam   Constitutional: Pt is oriented to person, place, and time and well-developed, well-nourished, and in no distress.   Mouth/Throat: Oropharynx is clear and moist.   Neck: Normal range of motion.   Cardiovascular: Normal rate, regular rhythm   Pulmonary/Chest: Effort normal Abdominal: Soft. Nontender  Skin: Skin is warm and dry.   Psychiatric: Mood, memory, affect and judgment  normal.     Assessment/Plan     Diagnosis:  gerd    Anticipated Surgical Procedure:  egd    The risks, benefits, and alternatives of this procedure have been discussed with the patient or the responsible party- the patient understands and agrees to proceed.

## 2019-04-11 LAB
CYTO UR: NORMAL
LAB AP CASE REPORT: NORMAL
PATH REPORT.FINAL DX SPEC: NORMAL
PATH REPORT.GROSS SPEC: NORMAL

## 2019-04-18 ENCOUNTER — TELEPHONE (OUTPATIENT)
Dept: GASTROENTEROLOGY | Facility: CLINIC | Age: 59
End: 2019-04-18

## 2019-04-18 NOTE — TELEPHONE ENCOUNTER
Mild inflammation seen on gastric bx- continue ppi once daily - gastric polyps benign - office f/u with np 6 weeks

## 2019-04-19 NOTE — TELEPHONE ENCOUNTER
Called pt and spoke with pt's  who is on the [employer] form.  ADvised per Dr Davila that his wife had mild inflammation seen on her stomach bx.  She recommends she continue ppi once daily.  The stomach polyps were benign.  She recommends she f/u with np in 6 wks. Pt's  verb understanding.

## 2019-05-02 ENCOUNTER — OFFICE VISIT (OUTPATIENT)
Dept: GASTROENTEROLOGY | Facility: CLINIC | Age: 59
End: 2019-05-02

## 2019-05-02 ENCOUNTER — TELEPHONE (OUTPATIENT)
Dept: GASTROENTEROLOGY | Facility: CLINIC | Age: 59
End: 2019-05-02

## 2019-05-02 VITALS
BODY MASS INDEX: 27.51 KG/M2 | WEIGHT: 171.2 LBS | TEMPERATURE: 98.8 F | HEIGHT: 66 IN | SYSTOLIC BLOOD PRESSURE: 118 MMHG | DIASTOLIC BLOOD PRESSURE: 70 MMHG

## 2019-05-02 DIAGNOSIS — K21.9 GASTROESOPHAGEAL REFLUX DISEASE, ESOPHAGITIS PRESENCE NOT SPECIFIED: Primary | ICD-10-CM

## 2019-05-02 DIAGNOSIS — R14.0 BLOATING: ICD-10-CM

## 2019-05-02 PROCEDURE — 99214 OFFICE O/P EST MOD 30 MIN: CPT | Performed by: NURSE PRACTITIONER

## 2019-05-02 NOTE — PROGRESS NOTES
Chief Complaint   Patient presents with   • Erosive gastritis-EGD follow up     HPI    Cristina Larose is a  58 y.o. female here for a follow up visit for GERD.  Patient underwent upper endoscopy with Dr. Davila on 4/10/2019 with the following findings normal esophagus, erosive gastritis, normal duodenum.  Pathology with mild inflammation recommendations to continue PPI once daily, gastric polyps were benign.    On visit today the patient feels remarkably better on daily PPI.  Denies abdominal pain, nausea, vomiting, poor appetite.  She does voice concerns of excessive bloating.  Patient eats small portions and avoid known triggers.  She is also on a daily probiotic.    She struggled constipation the past but is managing bowel pattern with fiber supplements, aloe vera juice, high-fiber diet and adequate hydration.  Bowels are moving daily with soft stools.  No rectal bleeding.    Patient's last colonoscopy was 2014 with one hyperplastic polyp removed.  Repeat colonoscopy in 10 years for screening purposes (2024)     Past Medical History:   Diagnosis Date   • Gallstone    • GERD (gastroesophageal reflux disease)    • Hearing loss in right ear    • History of kidney stones    • HL (hearing loss) 2014    Right ear has constant ringing   • Hyperlipidemia 2018    physical   • Insomnia    • Irritable bowel syndrome    • Kidney stone 2015    One attack several years ago   • Lactose intolerance     I do better when drinking lactose free milk   • Low back pain    • PONV (postoperative nausea and vomiting)    • Ringing of ears    • Scoliosis     Scoliosis       Past Surgical History:   Procedure Laterality Date   • BUNIONECTOMY Right 2003    bunionectomy   • CHOLECYSTECTOMY     • CHOLECYSTECTOMY WITH INTRAOPERATIVE CHOLANGIOGRAM N/A 9/29/2016    Procedure: CHOLECYSTECTOMY LAPAROSCOPIC INTRAOPERATIVE CHOLANGIOGRAM;  Surgeon: Adeline Kumar MD;  Location: [facility] OR [facility];  Service:    • COLONOSCOPY  11/14/2014    Dr. Davila,  diverticulosis, IH, one HP polyp   • ENDOSCOPY  06/05/2014    LA Grade A reflux esophagitis, erosive gastropathy   • ENDOSCOPY N/A 4/10/2019    erosive gastritis   • HYSTERECTOMY  1999    Dr. Friend   • INCISION AND DRAINAGE EXTERNAL EAR      left   • PARTIAL HYSTERECTOMY  1999    Left overy in tack       Scheduled Meds:  Outpatient Encounter Medications as of 5/2/2019   Medication Sig Dispense Refill   • ALOE VERA JUICE PO Take  by mouth.     • amitriptyline (ELAVIL) 10 MG tablet      • atorvastatin (LIPITOR) 20 MG tablet      • Cholecalciferol (VITAMIN D PO) Take 3 tablets by mouth every night.     • Cyanocobalamin (B-12 PO) Take 1 tablet by mouth 3 (three) times a week.     • estradiol (MINIVELLE, VIVELLE-DOT) 0.1 MG/24HR patch      • Magnesium 250 MG tablet Take 200 mg by mouth As Needed.     • pantoprazole (PROTONIX) 40 MG EC tablet Take 1 tablet by mouth Daily. 90 tablet 1   • psyllium (METAMUCIL) 58.6 % packet Take 1 packet by mouth Daily.       No facility-administered encounter medications on file as of 5/2/2019.        Continuous Infusions:  No current facility-administered medications for this visit.     PRN Meds:.    Allergies   Allergen Reactions   • Roxicet [Oxycodone-Acetaminophen] Nausea Only and Other (See Comments)     Headache        Social History     Socioeconomic History   • Marital status:      Spouse name: Not on file   • Number of children: Not on file   • Years of education: Not on file   • Highest education level: Not on file   Tobacco Use   • Smoking status: Never Smoker   • Smokeless tobacco: Never Used   • Tobacco comment: never used   Substance and Sexual Activity   • Alcohol use: Yes     Types: 1 Glasses of wine per week     Comment: Drink maybe once a month   • Drug use: No   • Sexual activity: Yes     Partners: Male     Birth control/protection: None     Comment: Hysterectomy       Family History   Problem Relation Age of Onset   • Heart disease Mother    • Irritable  bowel syndrome Mother    • Lung disease Father         chronic obstructive pulmonary disease   • COPD Father    • Colon polyps Father         developed in late life   • Cancer Sister         brain   • Early death Sister         Brain cancer   • Inflammatory bowel disease Maternal Grandmother         history of constipation   • Stomach cancer Sister         surgery to remove his stomach       Review of Systems   Constitutional: Negative for activity change, appetite change, fatigue, fever and unexpected weight change.   HENT: Negative for trouble swallowing.    Respiratory: Negative for apnea, cough, choking, chest tightness, shortness of breath and wheezing.    Cardiovascular: Negative for chest pain, palpitations and leg swelling.   Gastrointestinal: Negative for abdominal distention, abdominal pain, anal bleeding, blood in stool, constipation, diarrhea, nausea, rectal pain and vomiting.        + bloating        Vitals:    05/02/19 1434   BP: 118/70   Temp: 98.8 °F (37.1 °C)       Physical Exam   Constitutional: She is oriented to person, place, and time. She appears well-developed and well-nourished.   Eyes: Pupils are equal, round, and reactive to light.   Cardiovascular: Normal rate, regular rhythm and normal heart sounds.   Pulmonary/Chest: Effort normal and breath sounds normal. No respiratory distress. She has no wheezes.   Abdominal: Soft. Bowel sounds are normal. She exhibits no distension and no mass. There is no tenderness. There is no guarding. No hernia.   Musculoskeletal: Normal range of motion.   Neurological: She is alert and oriented to person, place, and time.   Skin: Skin is warm and dry. Capillary refill takes less than 2 seconds.   Psychiatric: She has a normal mood and affect. Her behavior is normal.       No images are attached to the encounter.    Cristina was seen today for erosive gastritis-egd follow up.    Diagnoses and all orders for this visit:    Gastroesophageal reflux disease,  esophagitis presence not specified    Bloating  Comments:  SIBO breath test ordered     Assessment    #1 GERD, patient continues to improve on daily PPI.  I reviewed recent endoscopic findings as well as pathology.  Patient presented with a list of multiple questions all of which were answered.  I spent 25 minutes face-to-face counseling patient on avoiding NSAIDs and GERD diet lifestyle modifications.  #2 bloating, could be a component of SIBO.     Plan    Continue current PPI.  Avoid NSAIDs.  Continue probiotics.  Probiotic educational handout provided.  Continue high-fiber diet.  Follow-up 3 months.

## 2019-05-16 NOTE — TELEPHONE ENCOUNTER
pt called back   Received: Today   Message Contents   Angella Galo [facility] Clinical 1 Pool             She is out of town & cant do it until Saturday

## 2019-06-06 ENCOUNTER — TELEPHONE (OUTPATIENT)
Dept: GASTROENTEROLOGY | Facility: CLINIC | Age: 59
End: 2019-06-06

## 2019-06-06 NOTE — TELEPHONE ENCOUNTER
Called pt and on identified vm advised per Dr Davila that her sibo breath testing was neg.  Advised pt to call with questions.

## 2019-07-18 DIAGNOSIS — E78.5 HYPERLIPIDEMIA, UNSPECIFIED HYPERLIPIDEMIA TYPE: Primary | ICD-10-CM

## 2019-07-24 LAB
ALBUMIN SERPL-MCNC: 4.6 G/DL (ref 3.5–5.2)
ALBUMIN/GLOB SERPL: 2.6 G/DL
ALP SERPL-CCNC: 85 U/L (ref 39–117)
ALT SERPL-CCNC: 12 U/L (ref 1–33)
AST SERPL-CCNC: 16 U/L (ref 1–32)
BILIRUB SERPL-MCNC: 0.5 MG/DL (ref 0.2–1.2)
BUN SERPL-MCNC: 12 MG/DL (ref 6–20)
BUN/CREAT SERPL: 14.5 (ref 7–25)
CALCIUM SERPL-MCNC: 9.3 MG/DL (ref 8.6–10.5)
CHLORIDE SERPL-SCNC: 101 MMOL/L (ref 98–107)
CHOLEST SERPL-MCNC: 252 MG/DL (ref 0–200)
CO2 SERPL-SCNC: 28.4 MMOL/L (ref 22–29)
CREAT SERPL-MCNC: 0.83 MG/DL (ref 0.57–1)
GLOBULIN SER CALC-MCNC: 1.8 GM/DL
GLUCOSE SERPL-MCNC: 86 MG/DL (ref 65–99)
HDLC SERPL-MCNC: 75 MG/DL (ref 40–60)
LDLC SERPL CALC-MCNC: 156 MG/DL (ref 0–100)
LDLC/HDLC SERPL: 2.08 {RATIO}
POTASSIUM SERPL-SCNC: 4.8 MMOL/L (ref 3.5–5.2)
PROT SERPL-MCNC: 6.4 G/DL (ref 6–8.5)
SODIUM SERPL-SCNC: 140 MMOL/L (ref 136–145)
TRIGL SERPL-MCNC: 105 MG/DL (ref 0–150)
VLDLC SERPL CALC-MCNC: 21 MG/DL

## 2019-07-30 ENCOUNTER — OFFICE VISIT (OUTPATIENT)
Dept: FAMILY MEDICINE CLINIC | Facility: CLINIC | Age: 59
End: 2019-07-30

## 2019-07-30 VITALS
WEIGHT: 172 LBS | SYSTOLIC BLOOD PRESSURE: 130 MMHG | BODY MASS INDEX: 27.64 KG/M2 | HEIGHT: 66 IN | OXYGEN SATURATION: 98 % | HEART RATE: 59 BPM | TEMPERATURE: 97.1 F | DIASTOLIC BLOOD PRESSURE: 80 MMHG | RESPIRATION RATE: 16 BRPM

## 2019-07-30 DIAGNOSIS — M41.20 OTHER IDIOPATHIC SCOLIOSIS, UNSPECIFIED SPINAL REGION: ICD-10-CM

## 2019-07-30 DIAGNOSIS — E78.5 HYPERLIPIDEMIA, UNSPECIFIED HYPERLIPIDEMIA TYPE: Primary | ICD-10-CM

## 2019-07-30 DIAGNOSIS — M54.50 CHRONIC RIGHT-SIDED LOW BACK PAIN WITHOUT SCIATICA: ICD-10-CM

## 2019-07-30 DIAGNOSIS — G89.29 CHRONIC RIGHT-SIDED LOW BACK PAIN WITHOUT SCIATICA: ICD-10-CM

## 2019-07-30 PROBLEM — M41.9 SCOLIOSIS: Status: ACTIVE | Noted: 2019-07-30

## 2019-07-30 PROCEDURE — 99214 OFFICE O/P EST MOD 30 MIN: CPT | Performed by: INTERNAL MEDICINE

## 2019-07-30 NOTE — PROGRESS NOTES
Subjective   Cristina Larose is a 58 y.o. female. Patient is here today for   Chief Complaint   Patient presents with   • Hyperlipidemia          Vitals:    07/30/19 0818   BP: 130/80   Pulse: 59   Resp: 16   Temp: 97.1 °F (36.2 °C)   SpO2: 98%       The following portions of the patient's history were reviewed and updated as appropriate: allergies, current medications, past family history, past medical history, past social history, past surgical history and problem list.    Past Medical History:   Diagnosis Date   • Gallstone    • GERD (gastroesophageal reflux disease)    • Hearing loss in right ear    • History of kidney stones    • HL (hearing loss) 2014    Right ear has constant ringing   • Hyperlipidemia 2018    physical   • Insomnia    • Irritable bowel syndrome    • Kidney stone 2015    One attack several years ago   • Lactose intolerance     I do better when drinking lactose free milk   • Low back pain    • PONV (postoperative nausea and vomiting)    • Ringing of ears    • Scoliosis     Scoliosis      Allergies   Allergen Reactions   • Roxicet [Oxycodone-Acetaminophen] Nausea Only and Other (See Comments)     Headache       Social History     Socioeconomic History   • Marital status:      Spouse name: Not on file   • Number of children: Not on file   • Years of education: Not on file   • Highest education level: Not on file   Tobacco Use   • Smoking status: Never Smoker   • Smokeless tobacco: Never Used   • Tobacco comment: never used   Substance and Sexual Activity   • Alcohol use: Yes     Types: 1 Glasses of wine per week     Comment: Drink maybe once a month   • Drug use: No   • Sexual activity: Yes     Partners: Male     Birth control/protection: None     Comment: Hysterectomy        Current Outpatient Medications:   •  ALOE VERA JUICE PO, Take  by mouth., Disp: , Rfl:   •  amitriptyline (ELAVIL) 10 MG tablet, , Disp: , Rfl:   •  atorvastatin (LIPITOR) 20 MG tablet, , Disp: , Rfl:   •   Cholecalciferol (VITAMIN D PO), Take 3 tablets by mouth every night., Disp: , Rfl:   •  Cyanocobalamin (B-12 PO), Take 1 tablet by mouth 3 (three) times a week., Disp: , Rfl:   •  estradiol (MINIVELLE, VIVELLE-DOT) 0.1 MG/24HR patch, , Disp: , Rfl:   •  Magnesium 250 MG tablet, Take 200 mg by mouth As Needed., Disp: , Rfl:   •  psyllium (METAMUCIL) 58.6 % packet, Take 1 packet by mouth Daily., Disp: , Rfl:      Objective   History of Present Illness Cristina is here for lab follow-up.  She has hyperlipidemia and previous was on atorvastatin which she stopped about a year ago.  She eats healthy and has been exercising.  She does cardiovascular exercise and yoga.  She has scoliosis and complains of lower right back pain.  She denies any radicular symptoms.  She also has hearing loss now is using hearing aids.  She did see ENT who diagnosed her with otosclerosis.  She had vascular screening tests earlier this year which were normal.    Review of Systems   Constitutional: Negative for activity change and unexpected weight change.   HENT: Positive for hearing loss and tinnitus.    Respiratory: Negative.    Cardiovascular: Negative.    Musculoskeletal: Positive for back pain.   Neurological: Negative for weakness and numbness.   Psychiatric/Behavioral: Negative.        Physical Exam   Constitutional: She appears well-developed and well-nourished.   Cardiovascular: Normal rate, regular rhythm and normal heart sounds.   123/80,112/80   Musculoskeletal: She exhibits no edema.   Neurological: She is alert.   Psychiatric: She has a normal mood and affect. Her behavior is normal. Judgment and thought content normal.       ASSESSMENT     Problem List Items Addressed This Visit        Cardiovascular and Mediastinum    Hyperlipidemia - Primary       Nervous and Auditory    Right-sided back pain    Relevant Orders    Ambulatory Referral to Orthopedic Surgery       Musculoskeletal and Integument    Scoliosis    Relevant Orders     Ambulatory Referral to Orthopedic Surgery          PLAN  Patient Instructions   Blood pressure remains normal.  Total and LDL cholesterol are high.  HDL cholesterol is excellent.  A comprehensive metabolic panel is normal.  Discussed diet and exercise.  [place] risk is 1%.  Reviewed and discussed lumbosacral spine x-ray from 2017.  Suggest that you see Dr. Rivera.      Return in about 1 year (around 7/30/2020) for labs CBC, CMP, lipid, TSH.

## 2019-07-30 NOTE — PATIENT INSTRUCTIONS
Blood pressure remains normal.  Total and LDL cholesterol are high.  HDL cholesterol is excellent.  A comprehensive metabolic panel is normal.  Discussed diet and exercise.  [place] risk is 1%.  Reviewed and discussed lumbosacral spine x-ray from 2017.  Suggest that you see Dr. Rivera.

## 2019-09-04 ENCOUNTER — OFFICE VISIT (OUTPATIENT)
Dept: ORTHOPEDIC SURGERY | Facility: CLINIC | Age: 59
End: 2019-09-04

## 2019-09-04 VITALS — WEIGHT: 172 LBS | BODY MASS INDEX: 27.64 KG/M2 | TEMPERATURE: 97.8 F | HEIGHT: 66 IN

## 2019-09-04 DIAGNOSIS — M54.50 LUMBAR SPINE PAIN: Primary | ICD-10-CM

## 2019-09-04 DIAGNOSIS — M41.129 ADOLESCENT SCOLIOSIS: ICD-10-CM

## 2019-09-04 PROCEDURE — 72100 X-RAY EXAM L-S SPINE 2/3 VWS: CPT | Performed by: ORTHOPAEDIC SURGERY

## 2019-09-04 PROCEDURE — 99214 OFFICE O/P EST MOD 30 MIN: CPT | Performed by: ORTHOPAEDIC SURGERY

## 2019-09-04 NOTE — PROGRESS NOTES
New patient or new problem visit    Chief Complaint   Patient presents with   • Lumbar Spine - Establish Care, Pain       HPI: She complains of continued low back pain and I have seen her before for this and scoliosis she is done physical therapy and does cardiovascular exercise.  Pain is moderate constant aching.  A therapist told her she should get a lift in her shoe.    PFSH: See chart- reviewed    Review of Systems   Constitutional: Negative for fever.   HENT: Positive for hearing loss and tinnitus.    Respiratory: Positive for apnea.    Cardiovascular: Negative for chest pain.   Gastrointestinal: Positive for constipation. Negative for abdominal pain.   Genitourinary: Negative for dysuria and pelvic pain.   Musculoskeletal: Positive for back pain and myalgias.   Neurological: Negative for weakness, numbness and headaches.       PE: Constitutional: Vital signs above-noted.  Awake, alert and oriented    Psychiatric: Affect and insight do not appear grossly disturbed.    Pulmonary: Breathing is unlabored, color is good.    Skin: Warm, dry and normal turgor    Cardiac: Pedal pulses intact.  No edema.    Eyesight and hearing appear adequate for examination purposes      Musculoskeletal:  There is mild tenderness to percussion and palpation of the spine. Motion appears undisturbed.  Posture is mildly scoliotic but balanced to coronal and sagittal inspection.    The skin about the area is intact.  There is no palpable or visible deformity.  There is no local spasm.       Neurologic:   Reflexes are absent or at least unobtainable in the patellae and achilles.   Motor function is undisturbed in quadriceps, EHL, and gastrocnemius   sensation appears symmetrically intact to light touch.  In the bilateral lower extremities there is no evidence of atrophy.   Clonus is absent..  Gait appears undisturbed.  SLR test negative      MEDICAL DECISION MAKING    XRAY: Plain film x-rays of the lumbar spine show a scoliosis which  would appear to be well balanced and probably close to that which I measured in 2017.  I do not really have enough length of the curve to do a good measurement on this lumbar x-ray as previous films were thoracolumbar.  The pelvis is absolutely level.    Other: n/a    Impression: Lumbar scoliosis developing spinal stenosis but is still mostly axial pain.    Plan: I reassured her she is doing all the right things and she can add physical therapy to revisit that if she likes to go ahead and put in an order for that and see her back as needed.  I told her that she does not need a lift in her shoe and that indeed that would throw the pelvis off balance and be more likely to hurt them help.  I answered many questions.  Answers for HPI/ROS submitted by the patient on 9/2/2019   Back pain  Chronicity: recurrent  Onset: more than 1 year ago  Frequency: daily  Progression since onset: waxing and waning  Pain location: sacro-iliac  Pain quality: aching  Radiates to: right thigh  Pain - numeric: 2/10  Pain is: the same all the time  Aggravated by: sitting, twisting  Stiffness is present: at night  bladder incontinence: No  bowel incontinence: No  leg pain: No  paresis: No  paresthesias: No  perianal numbness: No  tingling: No  weight loss: No  Risk factors: menopause

## 2019-09-24 ENCOUNTER — HOSPITAL ENCOUNTER (OUTPATIENT)
Dept: PHYSICAL THERAPY | Facility: HOSPITAL | Age: 59
Setting detail: THERAPIES SERIES
Discharge: HOME | End: 2019-09-24

## 2019-09-24 DIAGNOSIS — G89.29 CHRONIC MIDLINE LOW BACK PAIN WITHOUT SCIATICA: Primary | ICD-10-CM

## 2019-09-24 DIAGNOSIS — M54.50 CHRONIC MIDLINE LOW BACK PAIN WITHOUT SCIATICA: Primary | ICD-10-CM

## 2019-09-24 PROCEDURE — 97530 THERAPEUTIC ACTIVITIES: CPT

## 2019-09-24 PROCEDURE — 97161 PT EVAL LOW COMPLEX 20 MIN: CPT

## 2019-09-25 NOTE — THERAPY EVALUATION
Outpatient Physical Therapy Ortho Initial Evaluation  [facility]     Patient Name: Cristina Larose  : 1960  MRN: [id number]  Today's Date: 2019      Visit Date: 2019    Patient Active Problem List   Diagnosis   • Abnormal finding on thyroid function test   • Constipation   • Cannot sleep   • Adaptive colitis   • Hyperlipidemia   • Thyroid nodule   • Right-sided back pain   • Kidney stones   • Anxiety   • Abdominal bloating   • Gastropathy   • Esophagitis   • Loss of hair   • Hormone replacement therapy (postmenopausal)   • Abnormal EKG   • Gastroesophageal reflux disease without esophagitis   • Gastroesophageal reflux disease   • Esophageal dysphagia   • Scoliosis   • Chronic right-sided low back pain without sciatica        Past Medical History:   Diagnosis Date   • Gallstone    • GERD (gastroesophageal reflux disease)    • Hearing loss in right ear    • History of kidney stones    • HL (hearing loss)     Right ear has constant ringing   • Hyperlipidemia 2018    physical   • Insomnia    • Irritable bowel syndrome    • Kidney stone 2015    One attack several years ago   • Lactose intolerance     I do better when drinking lactose free milk   • Low back pain    • PONV (postoperative nausea and vomiting)    • Ringing of ears    • Scoliosis     Scoliosis        Past Surgical History:   Procedure Laterality Date   • BUNIONECTOMY Right 2003    bunionectomy   • CHOLECYSTECTOMY     • CHOLECYSTECTOMY WITH INTRAOPERATIVE CHOLANGIOGRAM N/A 2016    Procedure: CHOLECYSTECTOMY LAPAROSCOPIC INTRAOPERATIVE CHOLANGIOGRAM;  Surgeon: Adeline Kumar MD;  Location: [facility] OR [facility];  Service:    • COLONOSCOPY  2014    Dr. Davila, diverticulosis, IH, one HP polyp   • ENDOSCOPY  2014    LA Grade A reflux esophagitis, erosive gastropathy   • ENDOSCOPY N/A 4/10/2019    erosive gastritis   • HYSTERECTOMY      Dr. Friend   • INCISION AND DRAINAGE EXTERNAL EAR      left   • PARTIAL  HYSTERECTOMY  1999    Left overy in tack       Visit Dx:     ICD-10-CM ICD-9-CM   1. Chronic midline low back pain without sciatica M54.5 724.2    G89.29 338.29         Patient History     Row Name 09/24/19 1600             History    Chief Complaint  Pain  -LB      Type of Pain  Back pain  -LB      Date Current Problem(s) Began  09/25/09  -LB      Brief Description of Current Complaint  Pt reports hx of chronic scoliosis. She has been seen for prior bout of skilled PT with continued perfomance of prior HEP and good management. She reports acute flare up of LBP due to independently performed exercise that aggravated her back. She went to MD but this pain has since resolved. She would now like to discuss current program, accept modifications, and ensure she is appropriately performing ther ex to continue to most successfully address chronic issue of LBP due to scoliosis.  -LB      Previous treatment for THIS PROBLEM  Rehabilitation  -LB      Patient/Caregiver Goals  Know what to do to help the symptoms  -LB      How has patient tried to help current problem?  HEP, walking, light resistance training, yoga  -LB      What clinical tests have you had for this problem?  X-ray  -LB         Pain     Pain Location  Back  -LB      Pain at Present  0  -LB      Pain at Best  0  -LB      Pain at Worst  8  -LB      Pain Frequency  Intermittent  -LB      Pain Description  Aching  -LB      What Performance Factors Make the Current Problem(s) WORSE?  certain movements, sitting  -LB      What Performance Factors Make the Current Problem(s) BETTER?  changing positions, exercise  -LB      Pain Comments  I feel better after I go to the gym.  -LB      Is your sleep disturbed?  Yes  -LB      What position do you sleep in?  Supine with pillow under knees  -LB      Difficulties at work?  Pain with inc sitting.  -LB      Difficulties with ADL's?  Pain with heavy lifting tasks.  -LB      Difficulties with recreational activities?  Able to  perform.  -LB         Fall Risk Assessment    Any falls in the past year:  No  -LB         Services    Prior Rehab/Home Health Experiences  Yes  -LB      When was the prior experience with Rehab/Home Health  2017  -LB      Where was the prior experience with Rehab/Home Health  [facility]  -LB      Are you currently receiving Home Health services  No  -LB      Do you plan to receive Home Health services in the near future  No  -LB         Daily Activities    Primary Language  English  -LB      Pt Participated in POC and Goals  Yes  -LB         Safety    Are you being hurt, hit, or frightened by anyone at home or in your life?  No  -LB      Are you being neglected by a caregiver  No  -LB        User Key  (r) = Recorded By, (t) = Taken By, (c) = Cosigned By    Initials Name Provider Type    Haley Lala PT Physical Therapist          PT Ortho     Row Name 09/25/19 1300       Subjective Comments    Subjective Comments  I just want to review my program and see what I can do better.  -LB       Subjective Pain    Able to rate subjective pain?  yes  -LB    Pre-Treatment Pain Level  0  -LB    Subjective Pain Comment  I just came from the gym so I feel good.  -LB      User Key  (r) = Recorded By, (t) = Taken By, (c) = Cosigned By    Initials Name Provider Type    Haley Lala, PT Physical Therapist                      Therapy Education  Education Details: Spent 30 minutes reviewing pt's current program, added core strengthening, discussed possibility of water aerobics, reviewed condition, prognosis, management, discussed joint protection strategies, postures to avoid, body mechanics.   Given: HEP, Symptoms/condition management, Posture/body mechanics, Mobility training, Pain management  Program: New  How Provided: Verbal, Demonstration, Written  Provided to: Patient  Level of Understanding: Teach back education performed, Verbalized, Demonstrated         PT Assessment/Plan     Row Name 09/25/19 1312          PT Assessment     Assessment Comments  Pt is 57 yo female previously known to this clinic for treatment of same issue reporting good tolerance to previous HEP and ability to self manage. She had several concerns about appropriate exercise prescription, frequency, and intensity in order to continue to protect lumbar spine and prevent progression of condition. We spent 30 minutes discussing scoliosis, appropriate managment of condition, postures/positions to avoid, workspace modifications to reduce effect of 8 hours of sitting in poor posture, aerobic activity to consider, impact vs. low impact activities, and safe repetition/lifting schemes for resistance training. Suggested water aerobics for pt, reduction of impact acitivity, added core strengthening to pt's program, adjusted several exercises to reduce lumbar strain, and discussed appropriate modifications for pt's yoga practice and home environment to best protect lumbar spine and improve pt's core strength. Pt verbalizes understanding and feels capable of continued independent management. She does not need further skilled PT visits at this time.   -LB        PT Plan    PT Plan Comments  d/c to independent management   -LB       User Key  (r) = Recorded By, (t) = Taken By, (c) = Cosigned By    Initials Name Provider Type    LB Haley Tavarez, PT Physical Therapist            OP Exercises     Row Name 09/25/19 1300             Subjective Comments    Subjective Comments  I just want to review my program and see what I can do better.  -LB         Subjective Pain    Able to rate subjective pain?  yes  -LB      Pre-Treatment Pain Level  0  -LB      Subjective Pain Comment  I just came from the gym so I feel good.  -LB         Total Minutes    35986 - PT Therapeutic Activity Minutes  30  -LB         Exercise 1    Exercise Name 1  Spent 30 minutes reviewing pt's current program, added core strengthening, discussed possibility of water aerobics, reviewed condition, prognosis,  management, discussed joint protection strategies, postures to avoid, body mechanics.   -LB        User Key  (r) = Recorded By, (t) = Taken By, (c) = Cosigned By    Initials Name Provider Type    Haley Lala, PT Physical Therapist                        Outcome Measure Options: Modifed [place]  Modified Oswestry  Modified Oswestry Score/Comments: 18% disability       Time Calculation:     Start Time: 1645  Stop Time: 1730  Time Calculation (min): 45 min  Total Timed Code Minutes- PT: 30 minute(s)     Therapy Charges for Today     Code Description Service Date Service Provider Modifiers Qty    [id number]  PT THERAPEUTIC ACT EA 15 MIN 9/24/2019 Haley Tavarez, PT GP 2    [id number]  PT EVAL LOW COMPLEXITY 1 9/24/2019 Haley Tavarez, PT GP 1          PT G-Codes  Outcome Measure Options: Modifed [place]  Modified Oswestry Score/Comments: 18% disability          Haley Tavarez PT  9/25/2019

## 2019-11-18 ENCOUNTER — DOCUMENTATION (OUTPATIENT)
Dept: PHYSICAL THERAPY | Facility: HOSPITAL | Age: 59
End: 2019-11-18

## 2019-11-18 NOTE — THERAPY DISCHARGE NOTE
Outpatient Physical Therapy Discharge Summary         Patient Name: Cristina Larose  : 1960  MRN: [id number]    Today's Date: 2019    Visit Dx:  No diagnosis found.        OP PT Discharge Summary  Date of Discharge: 19  Reason for Discharge: other (comment)(one time eval only)  Outcomes Achieved: Discharge from facility occurred on same date as evluation  Discharge Destination: Home with home program  Discharge Instructions/Additional Comments: reviewed pt's current HEP at evaluation. Pt with no further skilled PT needs at this time       Time Calculation:                    Haley Tavarez, PT  2019

## 2020-05-08 ENCOUNTER — TRANSCRIBE ORDERS (OUTPATIENT)
Dept: ADMINISTRATIVE | Facility: HOSPITAL | Age: 60
End: 2020-05-08

## 2020-05-08 DIAGNOSIS — Z12.31 VISIT FOR SCREENING MAMMOGRAM: Primary | ICD-10-CM

## 2020-05-15 ENCOUNTER — HOSPITAL ENCOUNTER (OUTPATIENT)
Dept: MAMMOGRAPHY | Facility: HOSPITAL | Age: 60
Discharge: HOME | End: 2020-05-15
Admitting: NURSE PRACTITIONER

## 2020-05-15 DIAGNOSIS — Z12.31 VISIT FOR SCREENING MAMMOGRAM: ICD-10-CM

## 2020-05-15 PROCEDURE — 77067 SCR MAMMO BI INCL CAD: CPT

## 2020-05-15 PROCEDURE — 77063 BREAST TOMOSYNTHESIS BI: CPT

## 2020-09-21 ENCOUNTER — TRANSCRIBE ORDERS (OUTPATIENT)
Dept: ADMINISTRATIVE | Facility: HOSPITAL | Age: 60
End: 2020-09-21

## 2020-09-21 DIAGNOSIS — R79.89 ABNORMAL THYROID BLOOD TEST: Primary | ICD-10-CM

## 2020-10-03 ENCOUNTER — HOSPITAL ENCOUNTER (OUTPATIENT)
Dept: MRI IMAGING | Facility: HOSPITAL | Age: 60
Discharge: HOME | End: 2020-10-03
Admitting: INTERNAL MEDICINE

## 2020-10-03 DIAGNOSIS — R79.89 ABNORMAL THYROID BLOOD TEST: ICD-10-CM

## 2020-10-03 PROCEDURE — 70553 MRI BRAIN STEM W/O & W/DYE: CPT

## 2020-10-03 PROCEDURE — A9577 INJ MULTIHANCE: HCPCS | Performed by: INTERNAL MEDICINE

## 2020-10-03 PROCEDURE — 82565 ASSAY OF CREATININE: CPT

## 2020-10-03 PROCEDURE — 0 GADOBENATE DIMEGLUMINE 529 MG/ML SOLUTION: Performed by: INTERNAL MEDICINE

## 2020-10-03 RX ADMIN — GADOBENATE DIMEGLUMINE 15 ML: 529 INJECTION, SOLUTION INTRAVENOUS at 13:25

## 2020-10-04 LAB — CREAT BLDA-MCNC: 0.9 MG/DL (ref 0.6–1.3)

## 2021-03-30 ENCOUNTER — BULK ORDERING (OUTPATIENT)
Dept: CASE MANAGEMENT | Facility: OTHER | Age: 61
End: 2021-03-30

## 2021-03-30 DIAGNOSIS — Z23 IMMUNIZATION DUE: ICD-10-CM

## 2021-10-06 ENCOUNTER — TELEPHONE (OUTPATIENT)
Dept: FAMILY MEDICINE CLINIC | Facility: CLINIC | Age: 61
End: 2021-10-06

## 2021-10-12 ENCOUNTER — TRANSCRIBE ORDERS (OUTPATIENT)
Dept: ADMINISTRATIVE | Facility: HOSPITAL | Age: 61
End: 2021-10-12

## 2021-10-12 DIAGNOSIS — Z12.31 OTHER SCREENING MAMMOGRAM: Primary | ICD-10-CM

## 2021-11-11 ENCOUNTER — TELEPHONE (OUTPATIENT)
Dept: FAMILY MEDICINE CLINIC | Facility: CLINIC | Age: 61
End: 2021-11-11

## 2021-11-11 NOTE — TELEPHONE ENCOUNTER
Caller: Cristina Larose    Relationship to patient: Self    Best call back number: [phone] (H)    Type of visit: PHYSICAL     Requested date:  EARLIER THAN January 2022    If rescheduling, when is the original appointment: 1/17/21     Additional notes:     PATIENT IS CURIOUS IF A NURSE PRACTITIONER IN THE OFFICE CAN DO HER PHYSICAL FOR HER. SHE WOULD LIKE TO BE SEEN EARLIER THAN 01/17/21.    PLEASE CALL AND ADVISE.

## 2021-11-19 DIAGNOSIS — Z13.29 SCREENING FOR THYROID DISORDER: ICD-10-CM

## 2021-11-19 DIAGNOSIS — E78.5 HYPERLIPIDEMIA, UNSPECIFIED HYPERLIPIDEMIA TYPE: Primary | ICD-10-CM

## 2021-11-19 DIAGNOSIS — Z00.00 PHYSICAL EXAM: ICD-10-CM

## 2021-11-24 LAB
ALBUMIN SERPL-MCNC: 4.1 G/DL (ref 3.8–4.9)
ALBUMIN/GLOB SERPL: 2.2 {RATIO} (ref 1.2–2.2)
ALP SERPL-CCNC: 69 IU/L (ref 44–121)
ALT SERPL-CCNC: 13 IU/L (ref 0–32)
AST SERPL-CCNC: 17 IU/L (ref 0–40)
BASOPHILS # BLD AUTO: 0 X10E3/UL (ref 0–0.2)
BASOPHILS NFR BLD AUTO: 1 %
BILIRUB SERPL-MCNC: 0.6 MG/DL (ref 0–1.2)
BUN SERPL-MCNC: 15 MG/DL (ref 6–24)
BUN/CREAT SERPL: 19 (ref 9–23)
CALCIUM SERPL-MCNC: 8.9 MG/DL (ref 8.7–10.2)
CHLORIDE SERPL-SCNC: 103 MMOL/L (ref 96–106)
CHOLEST SERPL-MCNC: 205 MG/DL (ref 100–199)
CO2 SERPL-SCNC: 25 MMOL/L (ref 20–29)
CREAT SERPL-MCNC: 0.81 MG/DL (ref 0.57–1)
EOSINOPHIL # BLD AUTO: 0.1 X10E3/UL (ref 0–0.4)
EOSINOPHIL NFR BLD AUTO: 2 %
ERYTHROCYTE [DISTWIDTH] IN BLOOD BY AUTOMATED COUNT: 12.4 % (ref 11.7–15.4)
GLOBULIN SER CALC-MCNC: 1.9 G/DL (ref 1.5–4.5)
GLUCOSE SERPL-MCNC: 91 MG/DL (ref 65–99)
HCT VFR BLD AUTO: 39.7 % (ref 34–46.6)
HDLC SERPL-MCNC: 52 MG/DL
HGB BLD-MCNC: 13.6 G/DL (ref 11.1–15.9)
IMM GRANULOCYTES # BLD AUTO: 0 X10E3/UL (ref 0–0.1)
IMM GRANULOCYTES NFR BLD AUTO: 0 %
LDLC SERPL CALC-MCNC: 133 MG/DL (ref 0–99)
LDLC/HDLC SERPL: 2.6 RATIO (ref 0–3.2)
LYMPHOCYTES # BLD AUTO: 1.4 X10E3/UL (ref 0.7–3.1)
LYMPHOCYTES NFR BLD AUTO: 28 %
MCH RBC QN AUTO: 32.9 PG (ref 26.6–33)
MCHC RBC AUTO-ENTMCNC: 34.3 G/DL (ref 31.5–35.7)
MCV RBC AUTO: 96 FL (ref 79–97)
MONOCYTES # BLD AUTO: 0.3 X10E3/UL (ref 0.1–0.9)
MONOCYTES NFR BLD AUTO: 6 %
NEUTROPHILS # BLD AUTO: 3.3 X10E3/UL (ref 1.4–7)
NEUTROPHILS NFR BLD AUTO: 63 %
PLATELET # BLD AUTO: 135 X10E3/UL (ref 150–450)
POTASSIUM SERPL-SCNC: 4.5 MMOL/L (ref 3.5–5.2)
PROT SERPL-MCNC: 6 G/DL (ref 6–8.5)
RBC # BLD AUTO: 4.13 X10E6/UL (ref 3.77–5.28)
SODIUM SERPL-SCNC: 139 MMOL/L (ref 134–144)
TRIGL SERPL-MCNC: 110 MG/DL (ref 0–149)
TSH SERPL DL<=0.005 MIU/L-ACNC: 0.43 UIU/ML (ref 0.45–4.5)
VLDLC SERPL CALC-MCNC: 20 MG/DL (ref 5–40)
WBC # BLD AUTO: 5.2 X10E3/UL (ref 3.4–10.8)

## 2021-12-03 ENCOUNTER — OFFICE VISIT (OUTPATIENT)
Dept: FAMILY MEDICINE CLINIC | Facility: CLINIC | Age: 61
End: 2021-12-03

## 2021-12-03 VITALS
RESPIRATION RATE: 18 BRPM | DIASTOLIC BLOOD PRESSURE: 86 MMHG | HEIGHT: 66 IN | TEMPERATURE: 97.7 F | SYSTOLIC BLOOD PRESSURE: 131 MMHG | WEIGHT: 169.2 LBS | BODY MASS INDEX: 27.19 KG/M2 | HEART RATE: 60 BPM | OXYGEN SATURATION: 97 %

## 2021-12-03 DIAGNOSIS — E78.5 HYPERLIPIDEMIA, UNSPECIFIED HYPERLIPIDEMIA TYPE: ICD-10-CM

## 2021-12-03 DIAGNOSIS — R94.6 ABNORMAL THYROID FUNCTION TEST: ICD-10-CM

## 2021-12-03 DIAGNOSIS — Z00.00 ANNUAL PHYSICAL EXAM: Primary | ICD-10-CM

## 2021-12-03 DIAGNOSIS — R03.0 ELEVATED BLOOD PRESSURE READING WITHOUT DIAGNOSIS OF HYPERTENSION: ICD-10-CM

## 2021-12-03 DIAGNOSIS — H80.93 OTOSCLEROSIS OF BOTH EARS: ICD-10-CM

## 2021-12-03 DIAGNOSIS — Z00.00 ENCOUNTER FOR PREVENTIVE CARE: ICD-10-CM

## 2021-12-03 DIAGNOSIS — K59.00 CONSTIPATION, UNSPECIFIED CONSTIPATION TYPE: ICD-10-CM

## 2021-12-03 PROCEDURE — 99396 PREV VISIT EST AGE 40-64: CPT | Performed by: STUDENT IN AN ORGANIZED HEALTH CARE EDUCATION/TRAINING PROGRAM

## 2022-01-24 ENCOUNTER — HOSPITAL ENCOUNTER (OUTPATIENT)
Dept: MAMMOGRAPHY | Facility: HOSPITAL | Age: 62
Discharge: HOME | End: 2022-01-24
Admitting: NURSE PRACTITIONER

## 2022-01-24 DIAGNOSIS — Z12.31 OTHER SCREENING MAMMOGRAM: ICD-10-CM

## 2022-01-24 PROCEDURE — 77063 BREAST TOMOSYNTHESIS BI: CPT

## 2022-01-24 PROCEDURE — 77067 SCR MAMMO BI INCL CAD: CPT

## 2022-09-29 ENCOUNTER — TELEPHONE (OUTPATIENT)
Dept: FAMILY MEDICINE CLINIC | Facility: CLINIC | Age: 62
End: 2022-09-29
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
## 2022-09-30 ENCOUNTER — TELEPHONE (OUTPATIENT)
Dept: FAMILY MEDICINE CLINIC | Facility: CLINIC | Age: 62
End: 2022-09-30
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
## 2022-09-30 NOTE — TELEPHONE ENCOUNTER
Hub staff attempted to follow warm transfer process and was unsuccessful     Caller: Pasha Larose    Relationship to patient: Emergency Contact    Best call back number: [phone]*    Patient is needing: CALLING TO CANCEL PATIENT'S APPOINTMENT. PATIENT WENT TO A URGENT CARE AND NO LONGER NEEDS APPOINTMENT. HUB UNABLE TO CANCEL APPOINTMENT DUE TO IN ARRIVED STATUS.  ATTEMPTED TO WARM TRANSFER X2, NO ANSWER.

## 2022-10-05 ENCOUNTER — OFFICE VISIT (OUTPATIENT)
Dept: FAMILY MEDICINE CLINIC | Facility: CLINIC | Age: 62
End: 2022-10-05

## 2022-10-05 VITALS
HEIGHT: 66 IN | DIASTOLIC BLOOD PRESSURE: 89 MMHG | SYSTOLIC BLOOD PRESSURE: 145 MMHG | RESPIRATION RATE: 18 BRPM | OXYGEN SATURATION: 99 % | HEART RATE: 56 BPM | BODY MASS INDEX: 26.93 KG/M2 | WEIGHT: 167.6 LBS | TEMPERATURE: 97.5 F

## 2022-10-05 DIAGNOSIS — N30.90 CYSTITIS: Primary | ICD-10-CM

## 2022-10-05 DIAGNOSIS — R10.84 GENERALIZED ABDOMINAL PAIN: ICD-10-CM

## 2022-10-05 DIAGNOSIS — R11.0 NAUSEA: ICD-10-CM

## 2022-10-05 DIAGNOSIS — R10.9 FLANK PAIN: ICD-10-CM

## 2022-10-05 PROBLEM — H90.5 SENSORINEURAL HEARING LOSS: Status: ACTIVE | Noted: 2021-03-25

## 2022-10-05 PROBLEM — E03.8 CENTRAL HYPOTHYROIDISM: Status: ACTIVE | Noted: 2021-09-24

## 2022-10-05 PROBLEM — H80.90 OTOSCLEROSIS: Status: ACTIVE | Noted: 2021-09-23

## 2022-10-05 PROBLEM — E23.6 PITUITARY CYST (HCC): Status: ACTIVE | Noted: 2021-03-25

## 2022-10-05 LAB
BILIRUB BLD-MCNC: NEGATIVE MG/DL
CLARITY, POC: ABNORMAL
COLOR UR: YELLOW
EXPIRATION DATE: ABNORMAL
GLUCOSE UR STRIP-MCNC: NEGATIVE MG/DL
KETONES UR QL: NEGATIVE
LEUKOCYTE EST, POC: NEGATIVE
Lab: ABNORMAL
NITRITE UR-MCNC: POSITIVE MG/ML
PH UR: 7 [PH] (ref 5–8)
PROT UR STRIP-MCNC: NEGATIVE MG/DL
RBC # UR STRIP: ABNORMAL /UL
SP GR UR: 1.01 (ref 1–1.03)
UROBILINOGEN UR QL: ABNORMAL

## 2022-10-05 PROCEDURE — 81003 URINALYSIS AUTO W/O SCOPE: CPT | Performed by: NURSE PRACTITIONER

## 2022-10-05 PROCEDURE — 99213 OFFICE O/P EST LOW 20 MIN: CPT | Performed by: NURSE PRACTITIONER

## 2022-10-05 RX ORDER — NITROFURANTOIN 25; 75 MG/1; MG/1
100 CAPSULE ORAL 2 TIMES DAILY
Qty: 10 CAPSULE | Refills: 0 | Status: SHIPPED | OUTPATIENT
Start: 2022-10-05 | End: 2022-10-10

## 2022-10-05 RX ORDER — FINASTERIDE 5 MG/1
TABLET, FILM COATED ORAL
COMMUNITY
Start: 2022-08-12

## 2022-10-05 RX ORDER — LEVOTHYROXINE SODIUM 0.1 MG/1
TABLET ORAL
COMMUNITY
Start: 2022-07-12

## 2022-10-05 RX ORDER — PROGESTERONE 100 MG/1
CAPSULE ORAL
COMMUNITY
Start: 2022-09-29

## 2022-10-05 NOTE — PROGRESS NOTES
Subjective  Answers for HPI/ROS submitted by the patient on 10/4/2022  Please describe your symptoms.: Back - Right - Flank pain that starts in the back under the rib cage, and circles around the side to the front of my chest.  Have you had these symptoms before?: Yes  How long have you been having these symptoms?: Greater than 2 weeks  Please list any medications you are currently taking for this condition.: NONE  Please describe any probable cause for these symptoms. : UNKNOWN  What is the primary reason for your visit?: Champ Larose is a 61 y.o.. female.     Pt got sick with covid  Infection, took paxlovid for 5 days and while taking paxlovid symptoms developed.  Pt admits to not driinking much fluids at the beginning of this and then started to drink more.  Pt has history of kidney stones    Flank Pain  This is a new problem. Episode onset: 15 days. Pain location: right flank. The quality of the pain is described as aching. Radiates to: from right low back to right side to right abd. Associated symptoms include abdominal pain. Pertinent negatives include no dysuria or fever.       The following portions of the patient's history were reviewed and updated as appropriate: allergies, current medications, past family history, past medical history, past social history, past surgical history and problem list.    Past Medical History:   Diagnosis Date   • Gallstone    • GERD (gastroesophageal reflux disease)    • Hearing loss in right ear    • History of kidney stones    • HL (hearing loss) 2014    Right ear has constant ringing   • Hyperlipidemia 2018    physical   • Insomnia    • Irritable bowel syndrome    • Kidney stone 2015    One attack several years ago   • Lactose intolerance     I do better when drinking lactose free milk   • Low back pain    • PONV (postoperative nausea and vomiting)    • Ringing of ears    • Scoliosis     Scoliosis       Past Surgical History:   Procedure Laterality Date   •  "BUNIONECTOMY Right 2003    bunionectomy   • CHOLECYSTECTOMY     • CHOLECYSTECTOMY WITH INTRAOPERATIVE CHOLANGIOGRAM N/A 9/29/2016    Procedure: CHOLECYSTECTOMY LAPAROSCOPIC INTRAOPERATIVE CHOLANGIOGRAM;  Surgeon: Adeline Kumar MD;  Location: [facility];  Service:    • COLONOSCOPY  11/14/2014    Dr. Davila, diverticulosis, IH, one HP polyp   • ENDOSCOPY  06/05/2014    LA Grade A reflux esophagitis, erosive gastropathy   • ENDOSCOPY N/A 4/10/2019    erosive gastritis   • HYSTERECTOMY  1999    Dr. Friend   • INCISION AND DRAINAGE EXTERNAL EAR      left   • SUBTOTAL HYSTERECTOMY  1999    Left overy in tack       Review of Systems   Constitutional: Negative for fever.   Gastrointestinal: Positive for abdominal pain and nausea. Negative for vomiting.   Genitourinary: Positive for flank pain. Negative for dysuria, frequency, hematuria and urgency.       Allergies   Allergen Reactions   • Roxicet [Oxycodone-Acetaminophen] Nausea Only and Other (See Comments)     Headache        Objective     Vitals:    10/05/22 0755   BP: 145/89   BP Location: Right arm   Patient Position: Sitting   Pulse: 56   Resp: 18   Temp: 97.5 °F (36.4 °C)   TempSrc: Oral   SpO2: 99%   Weight: 76 kg (167 lb 9.6 oz)   Height: 167.6 cm (65.98\")     Body mass index is 27.06 kg/m².    Physical Exam  Vitals reviewed.   HENT:      Head: Normocephalic.   Eyes:      Pupils: Pupils are equal, round, and reactive to light.   Cardiovascular:      Rate and Rhythm: Normal rate and regular rhythm.   Pulmonary:      Effort: Pulmonary effort is normal.      Breath sounds: Normal breath sounds.   Abdominal:      General: Bowel sounds are normal. There is no distension.      Palpations: Abdomen is soft. There is no mass.      Tenderness: There is no abdominal tenderness. There is no right CVA tenderness, left CVA tenderness, guarding or rebound.      Hernia: No hernia is present.   Musculoskeletal:         General: Normal range of motion.   Neurological:      " Mental Status: She is alert and oriented to person, place, and time.   Psychiatric:         Behavior: Behavior normal.           Current Outpatient Medications:   •  ALOE VERA JUICE PO, Take  by mouth., Disp: , Rfl:   •  Cholecalciferol (VITAMIN D PO), Take 3 tablets by mouth every night., Disp: , Rfl:   •  Cyanocobalamin (B-12 PO), Take 1 tablet by mouth 3 (three) times a week., Disp: , Rfl:   •  estradiol (MINIVELLE, VIVELLE-DOT) 0.1 MG/24HR patch, , Disp: , Rfl:   •  Magnesium 250 MG tablet, Take 200 mg by mouth As Needed., Disp: , Rfl:   •  finasteride (PROSCAR) 5 MG tablet, , Disp: , Rfl:   •  levothyroxine (SYNTHROID, LEVOTHROID) 100 MCG tablet, , Disp: , Rfl:   •  nitrofurantoin, macrocrystal-monohydrate, (Macrobid) 100 MG capsule, Take 1 capsule by mouth 2 (Two) Times a Day for 5 days., Disp: 10 capsule, Rfl: 0  •  Progesterone (PROMETRIUM) 100 MG capsule, , Disp: , Rfl:     Recent Results (from the past 2016 hour(s))   POCT urinalysis dipstick, automated    Collection Time: 10/05/22  8:20 AM    Specimen: Urine   Result Value Ref Range    Color Yellow Yellow, Straw, Dark Yellow, Carlota    Clarity, UA Slightly Cloudy (A) Clear    Specific Gravity  1.010 1.005 - 1.030    pH, Urine 7.0 5.0 - 8.0    Leukocytes Negative Negative    Nitrite, UA Positive (A) Negative    Protein, POC Negative Negative mg/dL    Glucose, UA Negative Negative mg/dL    Ketones, UA Negative Negative    Urobilinogen, UA 0.2 E.U./dL Normal, 0.2 E.U./dL    Bilirubin Negative Negative    Blood, UA Trace (A) Negative    Lot Number n/a     Expiration Date n/a            Diagnoses and all orders for this visit:    1. Cystitis (Primary)  -     Urine Culture - Urine, Urine, Clean Catch  -     nitrofurantoin, macrocrystal-monohydrate, (Macrobid) 100 MG capsule; Take 1 capsule by mouth 2 (Two) Times a Day for 5 days.  Dispense: 10 capsule; Refill: 0    2. Flank pain  -     POCT urinalysis dipstick, automated  -     CBC & Differential  -      Comprehensive metabolic panel    3. Generalized abdominal pain  Comments:  right upper and lower    4. Nausea        Patient Instructions   Continue to drink plenty of fluids  Will get labs to further eval.   Starting antibiotic due to positive nitrites. Awaiting urine culture        Return if symptoms worsen or fail to improve, for Dr. Schmitt as needed/as recommended.

## 2022-10-05 NOTE — PATIENT INSTRUCTIONS
Continue to drink plenty of fluids  Will get labs to further eval.   Starting antibiotic due to positive nitrites. Awaiting urine culture

## 2022-10-06 LAB
ALBUMIN SERPL-MCNC: 4.2 G/DL (ref 3.5–5.2)
ALBUMIN/GLOB SERPL: 2.3 G/DL
ALP SERPL-CCNC: 78 U/L (ref 39–117)
ALT SERPL-CCNC: 12 U/L (ref 1–33)
AST SERPL-CCNC: 18 U/L (ref 1–32)
BASOPHILS # BLD AUTO: 0.02 10*3/MM3 (ref 0–0.2)
BASOPHILS NFR BLD AUTO: 0.4 % (ref 0–1.5)
BILIRUB SERPL-MCNC: 0.3 MG/DL (ref 0–1.2)
BUN SERPL-MCNC: 12 MG/DL (ref 6–20)
BUN/CREAT SERPL: 16.2 (ref 7–25)
CALCIUM SERPL-MCNC: 9.5 MG/DL (ref 8.6–10.5)
CHLORIDE SERPL-SCNC: 100 MMOL/L (ref 98–107)
CO2 SERPL-SCNC: 29 MMOL/L (ref 22–29)
CREAT SERPL-MCNC: 0.74 MG/DL (ref 0.57–1)
EGFRCR SERPLBLD CKD-EPI 2021: 93.3 ML/MIN/1.73
EOSINOPHIL # BLD AUTO: 0.15 10*3/MM3 (ref 0–0.4)
EOSINOPHIL NFR BLD AUTO: 2.9 % (ref 0.3–6.2)
ERYTHROCYTE [DISTWIDTH] IN BLOOD BY AUTOMATED COUNT: 12.8 % (ref 12.3–15.4)
GLOBULIN SER CALC-MCNC: 1.8 GM/DL
GLUCOSE SERPL-MCNC: 87 MG/DL (ref 65–99)
HCT VFR BLD AUTO: 40.8 % (ref 34–46.6)
HGB BLD-MCNC: 13.3 G/DL (ref 12–15.9)
IMM GRANULOCYTES # BLD AUTO: 0.01 10*3/MM3 (ref 0–0.05)
IMM GRANULOCYTES NFR BLD AUTO: 0.2 % (ref 0–0.5)
LYMPHOCYTES # BLD AUTO: 1.25 10*3/MM3 (ref 0.7–3.1)
LYMPHOCYTES NFR BLD AUTO: 24.1 % (ref 19.6–45.3)
MCH RBC QN AUTO: 31.8 PG (ref 26.6–33)
MCHC RBC AUTO-ENTMCNC: 32.6 G/DL (ref 31.5–35.7)
MCV RBC AUTO: 97.6 FL (ref 79–97)
MONOCYTES # BLD AUTO: 0.33 10*3/MM3 (ref 0.1–0.9)
MONOCYTES NFR BLD AUTO: 6.4 % (ref 5–12)
NEUTROPHILS # BLD AUTO: 3.42 10*3/MM3 (ref 1.7–7)
NEUTROPHILS NFR BLD AUTO: 66 % (ref 42.7–76)
NRBC BLD AUTO-RTO: 0 /100 WBC (ref 0–0.2)
PLATELET # BLD AUTO: 147 10*3/MM3 (ref 140–450)
POTASSIUM SERPL-SCNC: 4.4 MMOL/L (ref 3.5–5.2)
PROT SERPL-MCNC: 6 G/DL (ref 6–8.5)
RBC # BLD AUTO: 4.18 10*6/MM3 (ref 3.77–5.28)
SODIUM SERPL-SCNC: 137 MMOL/L (ref 136–145)
WBC # BLD AUTO: 5.18 10*3/MM3 (ref 3.4–10.8)

## 2022-10-07 LAB
BACTERIA UR CULT: NORMAL
BACTERIA UR CULT: NORMAL

## 2023-03-02 ENCOUNTER — TRANSCRIBE ORDERS (OUTPATIENT)
Dept: ADMINISTRATIVE | Facility: HOSPITAL | Age: 63
End: 2023-03-02
Payer: COMMERCIAL

## 2023-03-02 DIAGNOSIS — Z12.31 VISIT FOR SCREENING MAMMOGRAM: Primary | ICD-10-CM

## 2023-03-18 ENCOUNTER — HOSPITAL ENCOUNTER (OUTPATIENT)
Dept: MAMMOGRAPHY | Facility: HOSPITAL | Age: 63
Discharge: HOME | End: 2023-03-18
Admitting: NURSE PRACTITIONER
Payer: COMMERCIAL

## 2023-03-18 DIAGNOSIS — Z12.31 VISIT FOR SCREENING MAMMOGRAM: ICD-10-CM

## 2023-03-18 PROCEDURE — 77067 SCR MAMMO BI INCL CAD: CPT

## 2023-03-18 PROCEDURE — 77063 BREAST TOMOSYNTHESIS BI: CPT

## 2023-09-25 ENCOUNTER — OFFICE VISIT (OUTPATIENT)
Dept: FAMILY MEDICINE CLINIC | Facility: CLINIC | Age: 63
End: 2023-09-25

## 2023-09-25 VITALS
OXYGEN SATURATION: 97 % | BODY MASS INDEX: 25.55 KG/M2 | TEMPERATURE: 98.6 F | RESPIRATION RATE: 16 BRPM | HEART RATE: 53 BPM | WEIGHT: 159 LBS | HEIGHT: 66 IN | SYSTOLIC BLOOD PRESSURE: 110 MMHG | DIASTOLIC BLOOD PRESSURE: 62 MMHG

## 2023-09-25 DIAGNOSIS — K58.2 IRRITABLE BOWEL SYNDROME WITH BOTH CONSTIPATION AND DIARRHEA: Primary | ICD-10-CM

## 2023-09-25 DIAGNOSIS — K59.09 OTHER CONSTIPATION: ICD-10-CM

## 2023-09-25 DIAGNOSIS — K64.9 HEMORRHOIDS, UNSPECIFIED HEMORRHOID TYPE: ICD-10-CM

## 2023-09-25 PROBLEM — Z82.49 FAMILY HISTORY OF ISCHEMIC HEART DISEASE (IHD): Status: ACTIVE | Noted: 2023-09-25

## 2023-09-25 PROBLEM — N95.1 MENOPAUSAL STATE: Status: ACTIVE | Noted: 2023-09-25

## 2023-09-25 PROBLEM — K58.9 IRRITABLE BOWEL SYNDROME: Status: ACTIVE | Noted: 2023-09-25

## 2023-09-25 PROCEDURE — 99213 OFFICE O/P EST LOW 20 MIN: CPT | Performed by: NURSE PRACTITIONER

## 2023-09-25 RX ORDER — HYDROCORTISONE 25 MG/G
CREAM TOPICAL 2 TIMES DAILY
Qty: 28 G | Refills: 2 | Status: SHIPPED | OUTPATIENT
Start: 2023-09-25

## 2023-09-25 NOTE — PATIENT INSTRUCTIONS
Continue to eat a healthy diet, recommend dietician for further education.  Continue to drink water through out day  Sending cream for external hemorrhoids and Linzess to help with constipation.

## 2023-09-25 NOTE — PROGRESS NOTES
Subjective     Cristina Larose is a 62 y.o.. female.     Patient here today with c/o external hemorrhoids. Patient having history of IBS. Patient having Colonoscopy 9/19 by Dr. Hunt which showed results of polyps and polypectomy and external hemorrhoids. Patient stating she is drinking 2 L of water a day, is eating healthy, is not eating meat, eating tuna, beans and nuts for her protein/iron. Patient admits she has constipation frequently and BM's hard.       The following portions of the patient's history were reviewed and updated as appropriate: allergies, current medications, past family history, past medical history, past social history, past surgical history and problem list.    Past Medical History:   Diagnosis Date    Gallstone     GERD (gastroesophageal reflux disease)     Hearing loss in right ear     History of kidney stones     HL (hearing loss) 2014    Right ear has constant ringing    Hyperlipidemia 2018    physical    Insomnia     Irritable bowel syndrome     Kidney stone 2015    One attack several years ago    Lactose intolerance     I do better when drinking lactose free milk    Low back pain     PONV (postoperative nausea and vomiting)     Ringing of ears     Scoliosis     Scoliosis       Past Surgical History:   Procedure Laterality Date    BUNIONECTOMY Right 2003    bunionectomy    CHOLECYSTECTOMY      CHOLECYSTECTOMY WITH INTRAOPERATIVE CHOLANGIOGRAM N/A 9/29/2016    Procedure: CHOLECYSTECTOMY LAPAROSCOPIC INTRAOPERATIVE CHOLANGIOGRAM;  Surgeon: Adeline Kumar MD;  Location: [facility] OR [facility] [place];  Service:     COLONOSCOPY  11/14/2014    Dr. Davila, diverticulosis, IH, one HP polyp    ENDOSCOPY  06/05/2014    LA Grade A reflux esophagitis, erosive gastropathy    ENDOSCOPY N/A 4/10/2019    erosive gastritis    HYSTERECTOMY  1999    Dr. Friend    INCISION AND DRAINAGE EXTERNAL EAR      left    SUBTOTAL HYSTERECTOMY  1999    Left overy in tack       Review of Systems   Constitutional: Negative.   "  Respiratory: Negative.     Cardiovascular: Negative.    Gastrointestinal:  Positive for constipation. Negative for abdominal distention, abdominal pain, diarrhea, nausea and vomiting.     Allergies   Allergen Reactions    Roxicet [Oxycodone-Acetaminophen] Nausea Only and Other (See Comments)     Headache        Objective     Vitals:    09/25/23 0808   BP: 110/62   Pulse: 53   Resp: 16   Temp: 98.6 °F (37 °C)   SpO2: 97%   Weight: 72.1 kg (159 lb)   Height: 167.6 cm (66\")     Body mass index is 25.66 kg/m².    Physical Exam  Vitals reviewed.   HENT:      Head: Normocephalic.   Eyes:      Pupils: Pupils are equal, round, and reactive to light.   Cardiovascular:      Rate and Rhythm: Normal rate and regular rhythm.   Pulmonary:      Effort: Pulmonary effort is normal.      Breath sounds: Normal breath sounds.   Genitourinary:     Rectum: External hemorrhoid (not engorged.) present.   Musculoskeletal:         General: Normal range of motion.   Skin:     General: Skin is warm and dry.   Neurological:      Mental Status: She is alert and oriented to person, place, and time.   Psychiatric:         Behavior: Behavior normal.         Current Outpatient Medications:     ALOE VERA JUICE PO, Take  by mouth., Disp: , Rfl:     Cholecalciferol (VITAMIN D PO), Take 3 tablets by mouth every night., Disp: , Rfl:     Cyanocobalamin (B-12 PO), Take 1 tablet by mouth 3 (three) times a week., Disp: , Rfl:     estradiol (MINIVELLE, VIVELLE-DOT) 0.1 MG/24HR patch, , Disp: , Rfl:     finasteride (PROSCAR) 5 MG tablet, , Disp: , Rfl:     levothyroxine (SYNTHROID, LEVOTHROID) 100 MCG tablet, , Disp: , Rfl:     Magnesium 250 MG tablet, Take 200 mg by mouth As Needed., Disp: , Rfl:     Progesterone (PROMETRIUM) 100 MG capsule, , Disp: , Rfl:     Hydrocortisone, Perianal, (Anusol-HC) 2.5 % rectal cream, Insert  into the rectum 2 (Two) Times a Day., Disp: 28 g, Rfl: 2    linaclotide (Linzess) 145 MCG capsule capsule, Take 1 capsule by mouth " Every Morning Before Breakfast., Disp: 30 capsule, Rfl: 2      Diagnoses and all orders for this visit:    1. Irritable bowel syndrome with both constipation and diarrhea (Primary)  -     Ambulatory Referral to Nutrition Services    2. Hemorrhoids, unspecified hemorrhoid type  -     Hydrocortisone, Perianal, (Anusol-HC) 2.5 % rectal cream; Insert  into the rectum 2 (Two) Times a Day.  Dispense: 28 g; Refill: 2    3. Other constipation  -     linaclotide (Linzess) 145 MCG capsule capsule; Take 1 capsule by mouth Every Morning Before Breakfast.  Dispense: 30 capsule; Refill: 2        Patient Instructions   Continue to eat a healthy diet, recommend dietician for further education.  Continue to drink water through out day  Sending cream for external hemorrhoids and Linzess to help with constipation.     Return if symptoms worsen or fail to improve, for Dr. Schmitt as needed/as recommended.

## 2023-10-13 ENCOUNTER — TELEPHONE (OUTPATIENT)
Dept: FAMILY MEDICINE CLINIC | Facility: CLINIC | Age: 63
End: 2023-10-13

## 2023-10-13 NOTE — TELEPHONE ENCOUNTER
"Caller: Cristina Larose \"Cristina Larose\"    Relationship: Self    Best call back number: [phone]     What is the best time to reach you: ANY TIME    Who are you requesting to speak with (clinical staff, provider,  specific staff member): CLINICAL STAFF    What was the call regarding: PATIENT STATES THAT HER PHARMACY INFORMED HER THAT THE LINZESS PRESCRIPTION NEEDS A PRIOR AUTHORIZATION.  SHE SAYS THAT HER INSURANCE SENT A PRIOR AUTHORIZATION REQUEST TO THE OFFICE AND SHE WOULD LIKE A STATUS UPDATE.    [facility] [id number] - [place] [id number] AT [facility] [address]  - [phone]  - [phone] FX      Is it okay if the provider responds through [employer]:     PLEASE ADVISE.        "

## 2023-10-19 NOTE — TELEPHONE ENCOUNTER
Called left message that a PA was done and it was approved. Patient needs to call the pharmacy to get it refilled.

## 2024-03-22 ENCOUNTER — TRANSCRIBE ORDERS (OUTPATIENT)
Dept: ADMINISTRATIVE | Facility: HOSPITAL | Age: 64
End: 2024-03-22
Payer: COMMERCIAL

## 2024-03-22 DIAGNOSIS — Z12.31 SCREENING MAMMOGRAM FOR BREAST CANCER: Primary | ICD-10-CM

## 2024-03-27 ENCOUNTER — HOSPITAL ENCOUNTER (OUTPATIENT)
Dept: MAMMOGRAPHY | Facility: HOSPITAL | Age: 64
Discharge: HOME | End: 2024-03-27
Admitting: NURSE PRACTITIONER
Payer: COMMERCIAL

## 2024-03-27 DIAGNOSIS — Z12.31 SCREENING MAMMOGRAM FOR BREAST CANCER: ICD-10-CM

## 2024-03-27 PROCEDURE — 77063 BREAST TOMOSYNTHESIS BI: CPT

## 2024-03-27 PROCEDURE — 77067 SCR MAMMO BI INCL CAD: CPT

## 2024-05-28 ENCOUNTER — TELEPHONE (OUTPATIENT)
Dept: FAMILY MEDICINE CLINIC | Facility: CLINIC | Age: 64
End: 2024-05-28
Payer: COMMERCIAL

## 2024-05-28 NOTE — TELEPHONE ENCOUNTER
"    Caller: Cristina Larose \"Cristina Larose\"    Relationship: Self    Best call back number: [phone]     What orders are you requesting (i.e. lab or imaging): CHOLESTEROL TEST     In what timeframe would the patient need to come in: BEFORE HER PHYSICAL ON 6/6/24    Where will you receive your lab/imaging services: IN OFFICE     Additional notes: PATIENT WOULD LIKE TO GET LABS DONE BEFORE HER APPOINTMENT. SHE IS ALSO SCHEDULED WITH PARDEEP EMMANUEL FOR THIS VISIT           "

## 2024-05-30 DIAGNOSIS — Z00.00 ANNUAL PHYSICAL EXAM: Primary | ICD-10-CM

## 2024-05-30 DIAGNOSIS — E78.5 HYPERLIPIDEMIA, UNSPECIFIED HYPERLIPIDEMIA TYPE: ICD-10-CM

## 2024-05-30 DIAGNOSIS — E04.1 THYROID NODULE: ICD-10-CM

## 2024-05-31 LAB
ALBUMIN SERPL-MCNC: 4.2 G/DL (ref 3.5–5.2)
ALBUMIN/GLOB SERPL: 2.3 G/DL
ALP SERPL-CCNC: 77 U/L (ref 39–117)
ALT SERPL-CCNC: 11 U/L (ref 1–33)
APPEARANCE UR: CLEAR
AST SERPL-CCNC: 15 U/L (ref 1–32)
BASOPHILS # BLD AUTO: 0.05 10*3/MM3 (ref 0–0.2)
BASOPHILS NFR BLD AUTO: 1 % (ref 0–1.5)
BILIRUB SERPL-MCNC: 0.6 MG/DL (ref 0–1.2)
BILIRUB UR QL STRIP: NEGATIVE
BUN SERPL-MCNC: 16 MG/DL (ref 8–23)
BUN/CREAT SERPL: 19.5 (ref 7–25)
CALCIUM SERPL-MCNC: 9.3 MG/DL (ref 8.6–10.5)
CHLORIDE SERPL-SCNC: 100 MMOL/L (ref 98–107)
CHOLEST SERPL-MCNC: 222 MG/DL (ref 0–200)
CO2 SERPL-SCNC: 27 MMOL/L (ref 22–29)
COLOR UR: YELLOW
CREAT SERPL-MCNC: 0.82 MG/DL (ref 0.57–1)
EGFRCR SERPLBLD CKD-EPI 2021: 81.5 ML/MIN/1.73
EOSINOPHIL # BLD AUTO: 0.17 10*3/MM3 (ref 0–0.4)
EOSINOPHIL NFR BLD AUTO: 3.5 % (ref 0.3–6.2)
ERYTHROCYTE [DISTWIDTH] IN BLOOD BY AUTOMATED COUNT: 12.2 % (ref 12.3–15.4)
GLOBULIN SER CALC-MCNC: 1.8 GM/DL
GLUCOSE SERPL-MCNC: 84 MG/DL (ref 65–99)
GLUCOSE UR QL STRIP: NEGATIVE
HCT VFR BLD AUTO: 41.6 % (ref 34–46.6)
HDLC SERPL-MCNC: 72 MG/DL (ref 40–60)
HGB BLD-MCNC: 13.8 G/DL (ref 12–15.9)
HGB UR QL STRIP: NEGATIVE
IMM GRANULOCYTES # BLD AUTO: 0.01 10*3/MM3 (ref 0–0.05)
IMM GRANULOCYTES NFR BLD AUTO: 0.2 % (ref 0–0.5)
KETONES UR QL STRIP: NEGATIVE
LDLC SERPL CALC-MCNC: 134 MG/DL (ref 0–100)
LDLC/HDLC SERPL: 1.84 {RATIO}
LEUKOCYTE ESTERASE UR QL STRIP: NEGATIVE
LYMPHOCYTES # BLD AUTO: 1.29 10*3/MM3 (ref 0.7–3.1)
LYMPHOCYTES NFR BLD AUTO: 26.8 % (ref 19.6–45.3)
MCH RBC QN AUTO: 32.8 PG (ref 26.6–33)
MCHC RBC AUTO-ENTMCNC: 33.2 G/DL (ref 31.5–35.7)
MCV RBC AUTO: 98.8 FL (ref 79–97)
MONOCYTES # BLD AUTO: 0.31 10*3/MM3 (ref 0.1–0.9)
MONOCYTES NFR BLD AUTO: 6.4 % (ref 5–12)
NEUTROPHILS # BLD AUTO: 2.98 10*3/MM3 (ref 1.7–7)
NEUTROPHILS NFR BLD AUTO: 62.1 % (ref 42.7–76)
NITRITE UR QL STRIP: NEGATIVE
PH UR STRIP: 7.5 [PH] (ref 5–8)
PLATELET # BLD AUTO: 144 10*3/MM3 (ref 140–450)
POTASSIUM SERPL-SCNC: 4.6 MMOL/L (ref 3.5–5.2)
PROT SERPL-MCNC: 6 G/DL (ref 6–8.5)
PROT UR QL STRIP: NEGATIVE
RBC # BLD AUTO: 4.21 10*6/MM3 (ref 3.77–5.28)
SODIUM SERPL-SCNC: 136 MMOL/L (ref 136–145)
SP GR UR STRIP: NORMAL (ref 1–1.03)
TRIGL SERPL-MCNC: 89 MG/DL (ref 0–150)
TSH SERPL DL<=0.005 MIU/L-ACNC: 0.94 UIU/ML (ref 0.27–4.2)
UROBILINOGEN UR STRIP-MCNC: NORMAL MG/DL
VLDLC SERPL CALC-MCNC: 16 MG/DL (ref 5–40)
WBC # BLD AUTO: 4.81 10*3/MM3 (ref 3.4–10.8)

## 2024-06-06 ENCOUNTER — OFFICE VISIT (OUTPATIENT)
Dept: FAMILY MEDICINE CLINIC | Facility: CLINIC | Age: 64
End: 2024-06-06
Payer: COMMERCIAL

## 2024-06-06 VITALS
TEMPERATURE: 96.7 F | HEIGHT: 66 IN | HEART RATE: 67 BPM | SYSTOLIC BLOOD PRESSURE: 116 MMHG | DIASTOLIC BLOOD PRESSURE: 80 MMHG | WEIGHT: 151.6 LBS | OXYGEN SATURATION: 96 % | RESPIRATION RATE: 15 BRPM | BODY MASS INDEX: 24.36 KG/M2

## 2024-06-06 DIAGNOSIS — Z00.00 ANNUAL PHYSICAL EXAM: Primary | ICD-10-CM

## 2024-06-06 DIAGNOSIS — M25.50 ARTHRALGIA, UNSPECIFIED JOINT: ICD-10-CM

## 2024-06-06 DIAGNOSIS — F41.9 ANXIETY: ICD-10-CM

## 2024-06-06 DIAGNOSIS — E78.2 MIXED HYPERLIPIDEMIA: ICD-10-CM

## 2024-06-06 PROCEDURE — 99396 PREV VISIT EST AGE 40-64: CPT | Performed by: NURSE PRACTITIONER

## 2024-06-06 PROCEDURE — 99213 OFFICE O/P EST LOW 20 MIN: CPT | Performed by: NURSE PRACTITIONER

## 2024-06-06 RX ORDER — ATORVASTATIN CALCIUM 10 MG/1
10 TABLET, FILM COATED ORAL DAILY
Qty: 90 TABLET | Refills: 1 | Status: SHIPPED | OUTPATIENT
Start: 2024-06-06

## 2024-06-06 RX ORDER — DULOXETIN HYDROCHLORIDE 20 MG/1
20 CAPSULE, DELAYED RELEASE ORAL DAILY
Qty: 90 CAPSULE | Refills: 1 | Status: SHIPPED | OUTPATIENT
Start: 2024-06-06

## 2024-06-06 NOTE — PATIENT INSTRUCTIONS
Hyperlipidemia: starting atorvastatin 10 mg 1 tab daily; recommend Patient to eat a heart healthy low fat diet, drink plenty of water with goal 64 oz a day and exercise 3-5 times a week, for more than 30 minutes at a time.    Anxiety/arthralgia: starting duloxetine 20 mg 1 tab daily. recommend getting regular exercise, getting outside, getting plenty of sleep, and eating a heart healthy diet.    Follow up with dentist and optometrist when able  Always use seat belt when in vehicles

## 2024-06-06 NOTE — PROGRESS NOTES
"Subjective   Cristina Larose is a 63 y.o. female. Patient is here today for   Chief Complaint   Patient presents with    Annual Exam          Vitals:    06/06/24 0812   BP: 116/80   Pulse: 67   Resp: 15   Temp: 96.7 °F (35.9 °C)   TempSrc: Temporal   SpO2: 96%   Weight: 68.8 kg (151 lb 9.6 oz)   Height: 167.6 cm (65.98\")      Body mass index is 24.48 kg/m².    The following portions of the patient's history were reviewed and updated as appropriate: allergies, current medications, past family history, past medical history, past social history, past surgical history and problem list.    Past Medical History:   Diagnosis Date    Gallstone     GERD (gastroesophageal reflux disease)     Hearing loss in right ear     History of kidney stones     HL (hearing loss) 2014    Right ear has constant ringing    Hyperlipidemia 2018    physical    Insomnia     Irritable bowel syndrome     Kidney stone 2015    One attack several years ago    Lactose intolerance     I do better when drinking lactose free milk    Low back pain     PONV (postoperative nausea and vomiting)     Ringing of ears     Scoliosis     Scoliosis      Allergies   Allergen Reactions    Roxicet [Oxycodone-Acetaminophen] Nausea Only and Other (See Comments)     Headache       Social History     Socioeconomic History    Marital status:    Tobacco Use    Smoking status: Never    Smokeless tobacco: Never    Tobacco comments:     never used   Substance and Sexual Activity    Alcohol use: Yes     Types: 1 Glasses of wine per week     Comment: Drink maybe once a month    Drug use: No    Sexual activity: Yes     Partners: Male     Birth control/protection: None     Comment: Hysterectomy        Current Outpatient Medications:     Cholecalciferol (VITAMIN D PO), Take 3 tablets by mouth every night., Disp: , Rfl:     Cyanocobalamin (B-12 PO), Take 1 tablet by mouth 3 (three) times a week., Disp: , Rfl:     estradiol (MINIVELLE, VIVELLE-DOT) 0.1 MG/24HR patch, , Disp: , " Rfl:     finasteride (PROSCAR) 5 MG tablet, , Disp: , Rfl:     Hydrocortisone, Perianal, (Anusol-HC) 2.5 % rectal cream, Insert  into the rectum 2 (Two) Times a Day., Disp: 28 g, Rfl: 2    levothyroxine (SYNTHROID, LEVOTHROID) 100 MCG tablet, , Disp: , Rfl:     linaclotide (Linzess) 145 MCG capsule capsule, Take 1 capsule by mouth Every Morning Before Breakfast., Disp: 30 capsule, Rfl: 2    Magnesium 250 MG tablet, Take 200 mg by mouth As Needed., Disp: , Rfl:     Progesterone (PROMETRIUM) 100 MG capsule, , Disp: , Rfl:     atorvastatin (LIPITOR) 10 MG tablet, Take 1 tablet by mouth Daily., Disp: 90 tablet, Rfl: 1    DULoxetine (CYMBALTA) 20 MG capsule, Take 1 capsule by mouth Daily., Disp: 90 capsule, Rfl: 1     Last Mammogram: 3/27/24 negative  GYN: Mildred Rincon, APRN, July each year  Has had hysterectomy 1999    Last Colonoscopy: 9/19/23 by Dr. Hunt with results of polyps, repeat in 5-10 years    ECG Report: EKG 6/26/18 sbrady; Echo 7/25/18 showed Left vent systolic function wnl, EF 65%,Mild aortic valve regurg; Vascular study 2/2019 wnl    Sees rosy Good    Objective   History of Present Illness  Patient here today for annual physical.     Cristina Larose 63 y.o. female who presents for an Annual Wellness Visit.  she has a history of   Patient Active Problem List   Diagnosis    Abnormal finding on thyroid function test    Constipation    Cannot sleep    Adaptive colitis    Hyperlipidemia    Thyroid nodule    Right-sided back pain    Kidney stones    Anxiety    Abdominal bloating    Gastropathy    Esophagitis    Loss of hair    Hormone replacement therapy (postmenopausal)    Electrocardiogram abnormal    Gastroesophageal reflux disease without esophagitis    Gastroesophageal reflux disease    Esophageal dysphagia    Scoliosis    Chronic right-sided low back pain without sciatica    Central hypothyroidism    Hormone replacement therapy    Otosclerosis    Pituitary cyst    Sensorineural hearing loss     Family history of ischemic heart disease (IHD)    Menopausal state    Irritable bowel syndrome    Hemorrhoids    Arthralgia   .  she has been doing well with new interval problems.  Labs results discussed in detail with the patient.  Plan to update vaccines if needed today.    Health Habits:  Dental Exam. not up to date - before 2019  Eye Exam. up to date; 6/5/24  Exercise: 6 times/week.  Current exercise activities include: aerobics and light weights/kettlebells, goes to gym   Diet: healthy diet  Water: 2 L a day      Preventative counseling  Discussed face to face the importance of healthy diet and exercise.     PHQ-9 Depression Screening  Little interest or pleasure in doing things? 0-->not at all   Feeling down, depressed, or hopeless? 0-->not at all   Trouble falling or staying asleep, or sleeping too much?     Feeling tired or having little energy?     Poor appetite or overeating?     Feeling bad about yourself - or that you are a failure or have let yourself or your family down?     Trouble concentrating on things, such as reading the newspaper or watching television?     Moving or speaking so slowly that other people could have noticed? Or the opposite - being so fidgety or restless that you have been moving around a lot more than usual?     Thoughts that you would be better off dead, or of hurting yourself in some way?     PHQ-9 Total Score 0   If you checked off any problems, how difficult have these problems made it for you to do your work, take care of things at home, or get along with other people?          Recent Results (from the past 336 hour(s))   CBC & Differential    Collection Time: 05/30/24  9:48 AM    Specimen: Blood   Result Value Ref Range    WBC 4.81 3.40 - 10.80 10*3/mm3    RBC 4.21 3.77 - 5.28 10*6/mm3    Hemoglobin 13.8 12.0 - 15.9 g/dL    Hematocrit 41.6 34.0 - 46.6 %    MCV 98.8 (H) 79.0 - 97.0 fL    MCH 32.8 26.6 - 33.0 pg    MCHC 33.2 31.5 - 35.7 g/dL    RDW 12.2 (L) 12.3 - 15.4 %     Platelets 144 140 - 450 10*3/mm3    Neutrophil Rel % 62.1 42.7 - 76.0 %    Lymphocyte Rel % 26.8 19.6 - 45.3 %    Monocyte Rel % 6.4 5.0 - 12.0 %    Eosinophil Rel % 3.5 0.3 - 6.2 %    Basophil Rel % 1.0 0.0 - 1.5 %    Neutrophils Absolute 2.98 1.70 - 7.00 10*3/mm3    Lymphocytes Absolute 1.29 0.70 - 3.10 10*3/mm3    Monocytes Absolute 0.31 0.10 - 0.90 10*3/mm3    Eosinophils Absolute 0.17 0.00 - 0.40 10*3/mm3    Basophils Absolute 0.05 0.00 - 0.20 10*3/mm3    Immature Granulocyte Rel % 0.2 0.0 - 0.5 %    Immature Grans Absolute 0.01 0.00 - 0.05 10*3/mm3   Comprehensive Metabolic Panel    Collection Time: 05/30/24  9:48 AM    Specimen: Blood   Result Value Ref Range    Glucose 84 65 - 99 mg/dL    BUN 16 8 - 23 mg/dL    Creatinine 0.82 0.57 - 1.00 mg/dL    EGFR Result 81.5 >60.0 mL/min/1.73    BUN/Creatinine Ratio 19.5 7.0 - 25.0    Sodium 136 136 - 145 mmol/L    Potassium 4.6 3.5 - 5.2 mmol/L    Chloride 100 98 - 107 mmol/L    Total CO2 27.0 22.0 - 29.0 mmol/L    Calcium 9.3 8.6 - 10.5 mg/dL    Total Protein 6.0 6.0 - 8.5 g/dL    Albumin 4.2 3.5 - 5.2 g/dL    Globulin 1.8 gm/dL    A/G Ratio 2.3 g/dL    Total Bilirubin 0.6 0.0 - 1.2 mg/dL    Alkaline Phosphatase 77 39 - 117 U/L    AST (SGOT) 15 1 - 32 U/L    ALT (SGPT) 11 1 - 33 U/L   Lipid Panel With LDL / HDL Ratio    Collection Time: 05/30/24  9:48 AM    Specimen: Blood   Result Value Ref Range    Total Cholesterol 222 (H) 0 - 200 mg/dL    Triglycerides 89 0 - 150 mg/dL    HDL Cholesterol 72 (H) 40 - 60 mg/dL    VLDL Cholesterol Lalo 16 5 - 40 mg/dL    LDL Chol Calc (NIH) 134 (H) 0 - 100 mg/dL    LDL/HDL RATIO 1.84    Urinalysis With Microscopic If Indicated (No Culture) - Urine, Clean Catch    Collection Time: 05/30/24  9:48 AM    Specimen: Urine, Clean Catch   Result Value Ref Range    Specific Gravity, UA Comment 1.005 - 1.030    pH, UA 7.5 5.0 - 8.0    Color, UA Yellow     Appearance, UA Clear Clear    Leukocytes, UA Negative Negative    Protein Negative  Negative    Glucose, UA Negative Negative    Ketones Negative Negative    Blood, UA Negative Negative    Bilirubin, UA Negative Negative    Urobilinogen, UA Comment     Nitrite, UA Negative Negative   TSH Rfx On Abnormal To Free T4    Collection Time: 05/30/24  9:48 AM    Specimen: Blood   Result Value Ref Range    TSH 0.938 0.270 - 4.200 uIU/mL        Review of Systems   HENT:          [place], hearing aids, sees audiologist, history of tinnitus   Respiratory: Negative.     Cardiovascular: Negative.    Gastrointestinal: Negative.    Neurological: Negative.    Psychiatric/Behavioral: Negative.         Physical Exam  Constitutional:       Appearance: Normal appearance. She is well-developed.   HENT:      Head: Normocephalic and atraumatic.      Right Ear: Tympanic membrane normal. Tympanic membrane is not erythematous.      Left Ear: Tympanic membrane normal. Tympanic membrane is not erythematous.      Nose: Nose normal.   Eyes:      Conjunctiva/sclera: Conjunctivae normal.      Pupils: Pupils are equal, round, and reactive to light.   Neck:      Thyroid: No thyromegaly.      Vascular: No carotid bruit.      Trachea: No tracheal deviation.   Cardiovascular:      Rate and Rhythm: Normal rate and regular rhythm.      Pulses: Normal pulses.      Heart sounds: Normal heart sounds. No murmur heard.  Pulmonary:      Effort: No accessory muscle usage or respiratory distress.      Breath sounds: Normal breath sounds. No stridor. No decreased breath sounds, wheezing, rhonchi or rales.   Abdominal:      General: Bowel sounds are normal. There is no distension.      Palpations: Abdomen is soft. Abdomen is not rigid. There is no mass.      Tenderness: There is no abdominal tenderness. There is no guarding or rebound.      Hernia: No hernia is present.   Musculoskeletal:         General: Normal range of motion.      Cervical back: Normal range of motion and neck supple.   Lymphadenopathy:      Cervical: No cervical adenopathy.    Skin:     General: Skin is warm and dry.      Capillary Refill: Capillary refill takes less than 2 seconds.   Neurological:      Mental Status: She is alert and oriented to person, place, and time.      Cranial Nerves: No cranial nerve deficit.      Sensory: No sensory deficit.      Motor: No abnormal muscle tone.      Coordination: Coordination normal.   Psychiatric:         Mood and Affect: Mood is anxious.         Speech: Speech normal.         Behavior: Behavior normal.         ASSESSMENT       Problems Addressed this Visit          Cardiac and Vasculature    Hyperlipidemia    Relevant Medications    atorvastatin (LIPITOR) 10 MG tablet       Mental Health    Anxiety    Relevant Medications    DULoxetine (CYMBALTA) 20 MG capsule       Musculoskeletal and Injuries    Arthralgia    Relevant Medications    DULoxetine (CYMBALTA) 20 MG capsule     Other Visit Diagnoses       Annual physical exam    -  Primary          Diagnoses         Codes Comments    Annual physical exam    -  Primary ICD-10-CM: Z00.00  ICD-9-CM: V70.0     Mixed hyperlipidemia     ICD-10-CM: E78.2  ICD-9-CM: 272.2     Anxiety     ICD-10-CM: F41.9  ICD-9-CM: 300.00     Arthralgia, unspecified joint     ICD-10-CM: M25.50  ICD-9-CM: 719.40               PLAN    Patient Instructions   Hyperlipidemia: starting atorvastatin 10 mg 1 tab daily; recommend Patient to eat a heart healthy low fat diet, drink plenty of water with goal 64 oz a day and exercise 3-5 times a week, for more than 30 minutes at a time.    Anxiety/arthralgia: starting duloxetine 20 mg 1 tab daily. recommend getting regular exercise, getting outside, getting plenty of sleep, and eating a heart healthy diet.    Follow up with dentist and optometrist when able  Always use seat belt when in vehicles    Return if symptoms worsen or fail to improve, for recheck in 3 months.

## 2024-08-12 ENCOUNTER — TRANSCRIBE ORDERS (OUTPATIENT)
Dept: ADMINISTRATIVE | Facility: HOSPITAL | Age: 64
End: 2024-08-12
Payer: COMMERCIAL

## 2024-08-12 DIAGNOSIS — E23.6: Primary | ICD-10-CM

## 2024-12-23 DIAGNOSIS — Z00.00 ANNUAL PHYSICAL EXAM: Primary | ICD-10-CM

## 2024-12-23 DIAGNOSIS — E78.2 MIXED HYPERLIPIDEMIA: ICD-10-CM

## 2024-12-24 LAB
BASOPHILS # BLD AUTO: 0.04 10*3/MM3 (ref 0–0.2)
BASOPHILS NFR BLD AUTO: 0.7 % (ref 0–1.5)
CHOLEST SERPL-MCNC: 175 MG/DL (ref 0–200)
EOSINOPHIL # BLD AUTO: 0.15 10*3/MM3 (ref 0–0.4)
EOSINOPHIL NFR BLD AUTO: 2.8 % (ref 0.3–6.2)
ERYTHROCYTE [DISTWIDTH] IN BLOOD BY AUTOMATED COUNT: 12.6 % (ref 12.3–15.4)
HCT VFR BLD AUTO: 43.5 % (ref 34–46.6)
HDLC SERPL-MCNC: 82 MG/DL (ref 40–60)
HGB BLD-MCNC: 13.6 G/DL (ref 12–15.9)
IMM GRANULOCYTES # BLD AUTO: 0.01 10*3/MM3 (ref 0–0.05)
IMM GRANULOCYTES NFR BLD AUTO: 0.2 % (ref 0–0.5)
LDLC SERPL CALC-MCNC: 80 MG/DL (ref 0–100)
LDLC/HDLC SERPL: 0.96 {RATIO}
LYMPHOCYTES # BLD AUTO: 1.56 10*3/MM3 (ref 0.7–3.1)
LYMPHOCYTES NFR BLD AUTO: 28.6 % (ref 19.6–45.3)
MCH RBC QN AUTO: 31.8 PG (ref 26.6–33)
MCHC RBC AUTO-ENTMCNC: 31.3 G/DL (ref 31.5–35.7)
MCV RBC AUTO: 101.6 FL (ref 79–97)
MONOCYTES # BLD AUTO: 0.32 10*3/MM3 (ref 0.1–0.9)
MONOCYTES NFR BLD AUTO: 5.9 % (ref 5–12)
NEUTROPHILS # BLD AUTO: 3.37 10*3/MM3 (ref 1.7–7)
NEUTROPHILS NFR BLD AUTO: 61.8 % (ref 42.7–76)
NRBC BLD AUTO-RTO: 0 /100 WBC (ref 0–0.2)
PLATELET # BLD AUTO: 145 10*3/MM3 (ref 140–450)
RBC # BLD AUTO: 4.28 10*6/MM3 (ref 3.77–5.28)
TRIGL SERPL-MCNC: 71 MG/DL (ref 0–150)
VLDLC SERPL CALC-MCNC: 13 MG/DL (ref 5–40)
WBC # BLD AUTO: 5.45 10*3/MM3 (ref 3.4–10.8)

## 2024-12-31 ENCOUNTER — OFFICE VISIT (OUTPATIENT)
Dept: FAMILY MEDICINE CLINIC | Facility: CLINIC | Age: 64
End: 2024-12-31
Payer: COMMERCIAL

## 2024-12-31 VITALS
TEMPERATURE: 98.9 F | SYSTOLIC BLOOD PRESSURE: 116 MMHG | RESPIRATION RATE: 16 BRPM | WEIGHT: 147.3 LBS | HEIGHT: 66 IN | BODY MASS INDEX: 23.67 KG/M2 | OXYGEN SATURATION: 98 % | HEART RATE: 56 BPM | DIASTOLIC BLOOD PRESSURE: 80 MMHG

## 2024-12-31 DIAGNOSIS — K64.9 HEMORRHOIDS, UNSPECIFIED HEMORRHOID TYPE: ICD-10-CM

## 2024-12-31 DIAGNOSIS — R91.1 PULMONARY NODULE: ICD-10-CM

## 2024-12-31 DIAGNOSIS — K76.9 LIVER LESION: ICD-10-CM

## 2024-12-31 DIAGNOSIS — F41.9 ANXIETY: ICD-10-CM

## 2024-12-31 DIAGNOSIS — K58.9 IRRITABLE BOWEL SYNDROME, UNSPECIFIED TYPE: ICD-10-CM

## 2024-12-31 DIAGNOSIS — E78.2 MIXED HYPERLIPIDEMIA: Primary | ICD-10-CM

## 2024-12-31 PROCEDURE — 99213 OFFICE O/P EST LOW 20 MIN: CPT | Performed by: NURSE PRACTITIONER

## 2024-12-31 RX ORDER — ATORVASTATIN CALCIUM 10 MG/1
10 TABLET, FILM COATED ORAL DAILY
Qty: 90 TABLET | Refills: 3 | Status: SHIPPED | OUTPATIENT
Start: 2024-12-31

## 2024-12-31 RX ORDER — AMOXICILLIN 500 MG
1200 CAPSULE ORAL
COMMUNITY
Start: 2024-12-01

## 2024-12-31 RX ORDER — HYDROCORTISONE 25 MG/G
CREAM TOPICAL 2 TIMES DAILY
Qty: 28 G | Refills: 2 | Status: SHIPPED | OUTPATIENT
Start: 2024-12-31

## 2024-12-31 NOTE — PROGRESS NOTES
Subjective     Cristina Larose is a 64 y.o.. female.     Patient here today for follow up on hyperlipidemia, IBS and anxiety.    Patient stating she is eating healthy, eating oatmeal for breakfast and lunch, then regular dinner; states she is drinking plenty of water ~ 2 L a day; and exercising in gym-weight lifting 3 times a week and using her norditrack a couple times a week.    Patient having a CT cardiac calcium test on 9/20/22 with results 0. CT did show a fat containing lesion to liver; micro-nodule to right lung base.      Follow up from blood work  Symptoms are: recurrent.   Onset was more than 5 years.   Symptoms occur: intermittently.  Symptoms include: change in stool.   Pertinent negative symptoms include no abdominal pain, no anorexia, no joint pain, no chest pain, no chills, no congestion, no cough, no diaphoresis, no fatigue, no fever, no headaches, no joint swelling, no myalgias, no nausea, no neck pain, no numbness, no rash, no sore throat, no swollen glands, no dysuria, no vertigo, no visual change, no vomiting and no weakness.   [facility] and[facility] Medications [facility]   [facility] Follow-Up  Conditions present:  Other chronic condition  Pertinent negatives include no chest pain, no nausea, no fatigue, no weakness, no headaches, no neck pain, no myalgias, no abdominal pain, no cough, no sore throat, is not nervous/anxious, no diaphoresis and no visual change.   Abnormal Lab  Symptoms include change in stool.    Pertinent negative symptoms include no abdominal pain, no anorexia, no joint pain, no chest pain, no chills, no congestion, no cough, no diaphoresis, no fatigue, no fever, no headaches, no joint swelling, no myalgias, no nausea, no neck pain, no numbness, no rash, no sore throat, no swollen glands, no dysuria, no vertigo, no visual change, no vomiting and no weakness.       The following portions of the patient's history were reviewed and updated as appropriate:  allergies, current medications, past family history, past medical history, past social history, past surgical history and problem list.    Past Medical History:   Diagnosis Date    Gallstone     GERD (gastroesophageal reflux disease)     Hearing loss in right ear     History of kidney stones     HL (hearing loss) 2014    Right ear has constant ringing    Hyperlipidemia 2018    physical    Insomnia     Irritable bowel syndrome     Kidney stone 2015    One attack several years ago    Lactose intolerance     I do better when drinking lactose free milk    Low back pain     PONV (postoperative nausea and vomiting)     Ringing of ears     Scoliosis     Scoliosis       Past Surgical History:   Procedure Laterality Date    BUNIONECTOMY Right 2003    bunionectomy    CHOLECYSTECTOMY      CHOLECYSTECTOMY WITH INTRAOPERATIVE CHOLANGIOGRAM N/A 9/29/2016    Procedure: CHOLECYSTECTOMY LAPAROSCOPIC INTRAOPERATIVE CHOLANGIOGRAM;  Surgeon: Adeline Kumar MD;  [place] [facility] [place] [facility] [place]:     COLONOSCOPY  11/14/2014    Dr. Davila, diverticulosis, IH, one HP polyp    ENDOSCOPY  06/05/2014    LA Grade A reflux esophagitis, erosive gastropathy    ENDOSCOPY N/A 4/10/2019    erosive gastritis    HYSTERECTOMY  1999    Dr. Friend    INCISION AND DRAINAGE EXTERNAL EAR      left    SUBTOTAL HYSTERECTOMY  1999    Left overy in tack       Review of Systems   Constitutional: Negative.  Negative for chills, diaphoresis, fatigue and fever.   HENT:  Negative for congestion and sore throat.    Respiratory: Negative.  Negative for cough.    Cardiovascular: Negative.  Negative for chest pain.   Gastrointestinal:  Negative for abdominal pain, anorexia, nausea and vomiting.        Bm's more normal   Genitourinary:  Negative for dysuria.   Musculoskeletal:  Negative for joint pain, myalgias and neck pain.   Skin:  Negative for rash.   Neurological:  Negative for vertigo, weakness, numbness and headaches.   Psychiatric/Behavioral: Negative.   "Negative for dysphoric mood, self-injury, sleep disturbance and suicidal ideas. The patient is not nervous/anxious.        Allergies   Allergen Reactions    Roxicet [Oxycodone-Acetaminophen] Nausea Only and Other (See Comments)     Headache        Objective     Vitals:    12/31/24 0802   BP: 116/80   BP Location: Right arm   Patient Position: Sitting   Cuff Size: Adult   Pulse: 56   Resp: 16   Temp: 98.9 °F (37.2 °C)   TempSrc: Temporal   SpO2: 98%   Weight: 66.8 kg (147 lb 4.8 oz)   Height: 167.6 cm (65.98\")     Body mass index is 23.79 kg/m².    Physical Exam  Vitals reviewed.   HENT:      Head: Normocephalic.   Eyes:      Pupils: Pupils are equal, round, and reactive to light.   Cardiovascular:      Rate and Rhythm: Normal rate and regular rhythm.      Pulses: Normal pulses.   Pulmonary:      Effort: Pulmonary effort is normal.      Breath sounds: Normal breath sounds.   Musculoskeletal:         General: Normal range of motion.      Right lower leg: No edema.      Left lower leg: No edema.   Skin:     General: Skin is warm and dry.   Neurological:      Mental Status: She is alert and oriented to person, place, and time.   Psychiatric:         Behavior: Behavior normal.           Current Outpatient Medications:     atorvastatin (LIPITOR) 10 MG tablet, Take 1 tablet by mouth Daily., Disp: 90 tablet, Rfl: 3    Cholecalciferol (VITAMIN D PO), Take 3 tablets by mouth every night., Disp: , Rfl:     Cyanocobalamin (B-12 PO), Take 1 tablet by mouth 3 (three) times a week., Disp: , Rfl:     estradiol (MINIVELLE, VIVELLE-DOT) 0.1 MG/24HR patch, , Disp: , Rfl:     finasteride (PROSCAR) 5 MG tablet, , Disp: , Rfl:     Hydrocortisone, Perianal, (Anusol-HC) 2.5 % rectal cream, Insert  into the rectum 2 (Two) Times a Day., Disp: 28 g, Rfl: 2    levothyroxine (SYNTHROID, LEVOTHROID) 100 MCG tablet, , Disp: , Rfl:     linaclotide (Linzess) 145 MCG capsule capsule, Take 1 capsule by mouth Every Morning Before Breakfast. (Patient " taking differently: Take 1 capsule by mouth As Needed.), Disp: 30 capsule, Rfl: 2    Magnesium 250 MG tablet, Take 200 mg by mouth As Needed., Disp: , Rfl:     Omega-3 Fatty Acids (fish oil) 1200 MG capsule capsule, Take 1 capsule by mouth Daily With Breakfast., Disp: , Rfl:     Progesterone (PROMETRIUM) 100 MG capsule, , Disp: , Rfl:     Recent Results (from the past 12 weeks)   CBC & Differential    Collection Time: 12/24/24  8:15 AM    Specimen: Blood   Result Value Ref Range    WBC 5.45 3.40 - 10.80 10*3/mm3    RBC 4.28 3.77 - 5.28 10*6/mm3    Hemoglobin 13.6 12.0 - 15.9 g/dL    Hematocrit 43.5 34.0 - 46.6 %    .6 (H) 79.0 - 97.0 fL    MCH 31.8 26.6 - 33.0 pg    MCHC 31.3 (L) 31.5 - 35.7 g/dL    RDW 12.6 12.3 - 15.4 %    Platelets 145 140 - 450 10*3/mm3    Neutrophil Rel % 61.8 42.7 - 76.0 %    Lymphocyte Rel % 28.6 19.6 - 45.3 %    Monocyte Rel % 5.9 5.0 - 12.0 %    Eosinophil Rel % 2.8 0.3 - 6.2 %    Basophil Rel % 0.7 0.0 - 1.5 %    Neutrophils Absolute 3.37 1.70 - 7.00 10*3/mm3    Lymphocytes Absolute 1.56 0.70 - 3.10 10*3/mm3    Monocytes Absolute 0.32 0.10 - 0.90 10*3/mm3    Eosinophils Absolute 0.15 0.00 - 0.40 10*3/mm3    Basophils Absolute 0.04 0.00 - 0.20 10*3/mm3    Immature Granulocyte Rel % 0.2 0.0 - 0.5 %    Immature Grans Absolute 0.01 0.00 - 0.05 10*3/mm3    nRBC 0.0 0.0 - 0.2 /100 WBC   Lipid Panel With LDL / HDL Ratio    Collection Time: 12/24/24  8:15 AM    Specimen: Blood   Result Value Ref Range    Total Cholesterol 175 0 - 200 mg/dL    Triglycerides 71 0 - 150 mg/dL    HDL Cholesterol 82 (H) 40 - 60 mg/dL    VLDL Cholesterol Lalo 13 5 - 40 mg/dL    LDL Chol Calc (NIH) 80 0 - 100 mg/dL    LDL/HDL RATIO 0.96        Mammo Screening Digital Tomosynthesis Bilateral With CAD  Narrative: HISTORY: 63-year-old asymptomatic female     EXAMINATION: Bilateral digital mammography with R2 computer aided  detection and bilateral digital Tomosynthesis     FINDINGS: Bilateral digital CC and MLO  mammographic and digital  Tomosynthesis images were obtained. Comparison is made to prior studies  dated 3/18/2023 and 1/24/2022. Scattered fibroglandular densities are  seen throughout both breasts in a pattern which is unchanged. I see no  new or dominant masses, areas of architectural distortion or skin  thickening. There is no evidence for axillary lymphadenopathy or nipple  retraction.     Impression: 1. There is no evidence for malignancy or significant change in either  breast. Routine followup mammography is recommended.     BI-RADS category 1: Negative.        This report was finalized on 3/27/2024 10:12 AM by Dr. William Shelley M.D on Workstation: [id number]           Diagnoses and all orders for this visit:    1. Mixed hyperlipidemia (Primary)  -     atorvastatin (LIPITOR) 10 MG tablet; Take 1 tablet by mouth Daily.  Dispense: 90 tablet; Refill: 3    2. Anxiety    3. Irritable bowel syndrome, unspecified type    4. Hemorrhoids, unspecified hemorrhoid type  -     Hydrocortisone, Perianal, (Anusol-HC) 2.5 % rectal cream; Insert  into the rectum 2 (Two) Times a Day.  Dispense: 28 g; Refill: 2    5. Liver lesion  Comments:  fat containing    6. Pulmonary nodule  Comments:  micro-nodule to right lung base        Patient Instructions   Hyperlipidemia: stable, continue atorvastatin 10 mg 1 tab daily; continue to eat a heart healthy diet and drink plenty of water throughout the day.      Anxiety/arthralgia: Patient stating she stopped taking the duloxetine and is doing fine at this time. Patient denies need of medication and/or therapy. Will continue to monitor.      IBS: stable at this time, continue to monitor, continue to eat oatmeal daily for continue improvement.    Hemorrhoids: sending over more cream to be used as needed. Patient denies issues at this time but needs refill for future.    Liver lesion/Pulmonary nodule: recommend monitoring, repeat imaging as needed    Return for fasting labs in 6  months, recheck in 6 months.    Answers submitted by the patient for this visit:  Primary Reason for Visit (Submitted on 12/24/2024)  What is the primary reason for your visit?: Problem Not Listed

## 2025-01-02 PROBLEM — R91.1 PULMONARY NODULE: Status: ACTIVE | Noted: 2025-01-02

## 2025-01-02 PROBLEM — K76.9 LIVER LESION: Status: ACTIVE | Noted: 2025-01-02

## 2025-01-02 NOTE — PATIENT INSTRUCTIONS
Hyperlipidemia: stable, continue atorvastatin 10 mg 1 tab daily; continue to eat a heart healthy diet and drink plenty of water throughout the day.      Anxiety/arthralgia: Patient stating she stopped taking the duloxetine and is doing fine at this time. Patient denies need of medication and/or therapy. Will continue to monitor.      IBS: stable at this time, continue to monitor, continue to eat oatmeal daily for continue improvement.    Hemorrhoids: sending over more cream to be used as needed. Patient denies issues at this time but needs refill for future.    Liver lesion/Pulmonary nodule: recommend monitoring, repeat imaging as needed

## 2025-02-24 ENCOUNTER — TRANSCRIBE ORDERS (OUTPATIENT)
Dept: ADMINISTRATIVE | Facility: HOSPITAL | Age: 65
End: 2025-02-24
Payer: COMMERCIAL

## 2025-02-24 DIAGNOSIS — Z12.31 BREAST CANCER SCREENING BY MAMMOGRAM: Primary | ICD-10-CM

## 2025-04-10 ENCOUNTER — HOSPITAL ENCOUNTER (OUTPATIENT)
Dept: MAMMOGRAPHY | Facility: HOSPITAL | Age: 65
Discharge: HOME | End: 2025-04-10
Admitting: NURSE PRACTITIONER
Payer: COMMERCIAL

## 2025-04-10 DIAGNOSIS — Z12.31 BREAST CANCER SCREENING BY MAMMOGRAM: ICD-10-CM

## 2025-04-10 PROCEDURE — 77063 BREAST TOMOSYNTHESIS BI: CPT

## 2025-04-10 PROCEDURE — 77067 SCR MAMMO BI INCL CAD: CPT

## 2025-04-17 ENCOUNTER — PATIENT ROUNDING (BHMG ONLY) (OUTPATIENT)
Age: 65
End: 2025-04-17
Payer: COMMERCIAL

## 2025-04-17 NOTE — ED NOTES
Thank you for letting us care for you in your recent visit to our [facility] urgent care center. We would love to hear about your experience with us. Was this the first time you have visited our location?         We’re always looking for ways to make our patients’ experiences even better. Do you have any recommendations on ways we may improve?         I appreciate you taking the time to respond. Please be on the lookout for a survey about your recent visit from Brittany Rebit via text or email. We would greatly appreciate if you could fill that out and turn it back in. We want your voice to be heard and we value your feedback.    Thank you for choosing [facility] for your healthcare needs.         If you have concerns or would like to speak to me in person regarding your visit please feel free to give me a call. [phone]         Hope you get well soon and thank you.         Brisa Ledbetter  Practice Manager

## 2025-07-10 DIAGNOSIS — E78.2 MIXED HYPERLIPIDEMIA: Primary | ICD-10-CM

## 2025-07-10 DIAGNOSIS — E04.1 THYROID NODULE: ICD-10-CM

## 2025-07-14 LAB
ALBUMIN SERPL-MCNC: 4.2 G/DL (ref 3.5–5.2)
ALBUMIN/GLOB SERPL: 2.2 G/DL
ALP SERPL-CCNC: 80 U/L (ref 39–117)
ALT SERPL-CCNC: 17 U/L (ref 1–33)
APPEARANCE UR: CLEAR
AST SERPL-CCNC: 20 U/L (ref 1–32)
BASOPHILS # BLD AUTO: 0.04 10*3/MM3 (ref 0–0.2)
BASOPHILS NFR BLD AUTO: 0.7 % (ref 0–1.5)
BILIRUB SERPL-MCNC: 0.8 MG/DL (ref 0–1.2)
BILIRUB UR QL STRIP: NEGATIVE
BUN SERPL-MCNC: 13 MG/DL (ref 8–23)
BUN/CREAT SERPL: 15.9 (ref 7–25)
CALCIUM SERPL-MCNC: 9.2 MG/DL (ref 8.6–10.5)
CHLORIDE SERPL-SCNC: 102 MMOL/L (ref 98–107)
CHOLEST SERPL-MCNC: 156 MG/DL (ref 0–200)
CO2 SERPL-SCNC: 27 MMOL/L (ref 22–29)
COLOR UR: YELLOW
CREAT SERPL-MCNC: 0.82 MG/DL (ref 0.57–1)
EGFRCR SERPLBLD CKD-EPI 2021: 81 ML/MIN/1.73
EOSINOPHIL # BLD AUTO: 0.13 10*3/MM3 (ref 0–0.4)
EOSINOPHIL NFR BLD AUTO: 2.2 % (ref 0.3–6.2)
ERYTHROCYTE [DISTWIDTH] IN BLOOD BY AUTOMATED COUNT: 13.2 % (ref 12.3–15.4)
GLOBULIN SER CALC-MCNC: 1.9 GM/DL
GLUCOSE SERPL-MCNC: 81 MG/DL (ref 65–99)
GLUCOSE UR QL STRIP: NEGATIVE
HCT VFR BLD AUTO: 41.5 % (ref 34–46.6)
HDLC SERPL-MCNC: 73 MG/DL (ref 40–60)
HGB BLD-MCNC: 13.3 G/DL (ref 12–15.9)
HGB UR QL STRIP: NEGATIVE
IMM GRANULOCYTES # BLD AUTO: 0.02 10*3/MM3 (ref 0–0.05)
IMM GRANULOCYTES NFR BLD AUTO: 0.3 % (ref 0–0.5)
KETONES UR QL STRIP: NEGATIVE
LDLC SERPL CALC-MCNC: 68 MG/DL (ref 0–100)
LDLC/HDLC SERPL: 0.91 {RATIO}
LEUKOCYTE ESTERASE UR QL STRIP: NEGATIVE
LYMPHOCYTES # BLD AUTO: 1.43 10*3/MM3 (ref 0.7–3.1)
LYMPHOCYTES NFR BLD AUTO: 24 % (ref 19.6–45.3)
MCH RBC QN AUTO: 32.5 PG (ref 26.6–33)
MCHC RBC AUTO-ENTMCNC: 32 G/DL (ref 31.5–35.7)
MCV RBC AUTO: 101.5 FL (ref 79–97)
MONOCYTES # BLD AUTO: 0.36 10*3/MM3 (ref 0.1–0.9)
MONOCYTES NFR BLD AUTO: 6.1 % (ref 5–12)
NEUTROPHILS # BLD AUTO: 3.97 10*3/MM3 (ref 1.7–7)
NEUTROPHILS NFR BLD AUTO: 66.7 % (ref 42.7–76)
NITRITE UR QL STRIP: NEGATIVE
PH UR STRIP: 7.5 [PH] (ref 5–8)
PLATELET # BLD AUTO: 131 10*3/MM3 (ref 140–450)
POTASSIUM SERPL-SCNC: 4.4 MMOL/L (ref 3.5–5.2)
PROT SERPL-MCNC: 6.1 G/DL (ref 6–8.5)
PROT UR QL STRIP: NEGATIVE
RBC # BLD AUTO: 4.09 10*6/MM3 (ref 3.77–5.28)
SODIUM SERPL-SCNC: 137 MMOL/L (ref 136–145)
SP GR UR STRIP: 1.01 (ref 1–1.03)
T4 FREE SERPL-MCNC: 1.18 NG/DL (ref 0.93–1.7)
TRIGL SERPL-MCNC: 82 MG/DL (ref 0–150)
TSH SERPL DL<=0.005 MIU/L-ACNC: 0.42 UIU/ML (ref 0.27–4.2)
UROBILINOGEN UR STRIP-MCNC: NORMAL MG/DL
VLDLC SERPL CALC-MCNC: 15 MG/DL (ref 5–40)
WBC # BLD AUTO: 5.95 10*3/MM3 (ref 3.4–10.8)

## 2025-07-28 ENCOUNTER — OFFICE VISIT (OUTPATIENT)
Dept: FAMILY MEDICINE CLINIC | Facility: CLINIC | Age: 65
End: 2025-07-28
Payer: COMMERCIAL

## 2025-07-28 VITALS
TEMPERATURE: 98.4 F | BODY MASS INDEX: 22.63 KG/M2 | HEIGHT: 66 IN | RESPIRATION RATE: 16 BRPM | SYSTOLIC BLOOD PRESSURE: 110 MMHG | OXYGEN SATURATION: 98 % | WEIGHT: 140.8 LBS | HEART RATE: 52 BPM | DIASTOLIC BLOOD PRESSURE: 70 MMHG

## 2025-07-28 DIAGNOSIS — E03.8 CENTRAL HYPOTHYROIDISM: ICD-10-CM

## 2025-07-28 DIAGNOSIS — E23.6 PITUITARY CYST: ICD-10-CM

## 2025-07-28 DIAGNOSIS — H90.3 SENSORINEURAL HEARING LOSS (SNHL) OF BOTH EARS: ICD-10-CM

## 2025-07-28 DIAGNOSIS — E78.5 HYPERLIPIDEMIA, UNSPECIFIED HYPERLIPIDEMIA TYPE: ICD-10-CM

## 2025-07-28 DIAGNOSIS — Z00.00 ANNUAL PHYSICAL EXAM: Primary | ICD-10-CM

## 2025-07-28 DIAGNOSIS — D69.6 THROMBOCYTOPENIA: ICD-10-CM

## 2025-07-28 PROCEDURE — 99213 OFFICE O/P EST LOW 20 MIN: CPT | Performed by: NURSE PRACTITIONER

## 2025-07-28 PROCEDURE — 99396 PREV VISIT EST AGE 40-64: CPT | Performed by: NURSE PRACTITIONER

## 2025-07-29 ENCOUNTER — HOSPITAL ENCOUNTER (OUTPATIENT)
Dept: MRI IMAGING | Facility: HOSPITAL | Age: 65
Discharge: HOME | End: 2025-07-29
Admitting: INTERNAL MEDICINE
Payer: COMMERCIAL

## 2025-07-29 DIAGNOSIS — E23.6: ICD-10-CM

## 2025-07-29 PROCEDURE — 25510000002 GADOBENATE DIMEGLUMINE 529 MG/ML SOLUTION: Performed by: INTERNAL MEDICINE

## 2025-07-29 PROCEDURE — A9577 INJ MULTIHANCE: HCPCS | Performed by: INTERNAL MEDICINE

## 2025-07-29 PROCEDURE — 70553 MRI BRAIN STEM W/O & W/DYE: CPT

## 2025-07-29 RX ADMIN — GADOBENATE DIMEGLUMINE 13 ML: 529 INJECTION, SOLUTION INTRAVENOUS at 07:09

## 2025-08-05 PROBLEM — D69.6 THROMBOCYTOPENIA: Status: ACTIVE | Noted: 2025-08-05

## (undated) DEVICE — BITEBLOCK OMNI BLOC

## (undated) DEVICE — FRCP BX RADJAW4 NDL 2.8 240CM LG OG BX40

## (undated) DEVICE — CANN NASL CO2 TRULINK W/O2 A/

## (undated) DEVICE — Device: Brand: DEFENDO AIR/WATER/SUCTION AND BIOPSY VALVE

## (undated) DEVICE — TUBING, SUCTION, 1/4" X 10', STRAIGHT: Brand: MEDLINE